# Patient Record
Sex: FEMALE | Race: WHITE | NOT HISPANIC OR LATINO | Employment: OTHER | URBAN - METROPOLITAN AREA
[De-identification: names, ages, dates, MRNs, and addresses within clinical notes are randomized per-mention and may not be internally consistent; named-entity substitution may affect disease eponyms.]

---

## 2024-10-16 ENCOUNTER — APPOINTMENT (EMERGENCY)
Dept: RADIOLOGY | Facility: HOSPITAL | Age: 29
End: 2024-10-16

## 2024-10-16 ENCOUNTER — HOSPITAL ENCOUNTER (EMERGENCY)
Facility: HOSPITAL | Age: 29
Discharge: HOME | End: 2024-10-16
Attending: EMERGENCY MEDICINE

## 2024-10-16 VITALS
BODY MASS INDEX: 20.89 KG/M2 | WEIGHT: 117.95 LBS | DIASTOLIC BLOOD PRESSURE: 78 MMHG | SYSTOLIC BLOOD PRESSURE: 126 MMHG | RESPIRATION RATE: 16 BRPM | HEART RATE: 104 BPM | OXYGEN SATURATION: 98 % | TEMPERATURE: 98.6 F

## 2024-10-16 DIAGNOSIS — J18.9 PNEUMONIA: Primary | ICD-10-CM

## 2024-10-16 LAB
ANION GAP SERPL CALCULATED.3IONS-SCNC: 11 MMOL/L (ref 4–13)
BASOPHILS # BLD AUTO: 0.01 THOUSANDS/ΜL (ref 0–0.1)
BASOPHILS NFR BLD AUTO: 0 % (ref 0–1)
BUN SERPL-MCNC: 6 MG/DL (ref 5–25)
CALCIUM SERPL-MCNC: 9.9 MG/DL (ref 8.4–10.2)
CARDIAC TROPONIN I PNL SERPL HS: <2 NG/L
CHLORIDE SERPL-SCNC: 99 MMOL/L (ref 96–108)
CO2 SERPL-SCNC: 27 MMOL/L (ref 21–32)
CREAT SERPL-MCNC: 0.68 MG/DL (ref 0.6–1.3)
D DIMER PPP FEU-MCNC: 0.35 UG/ML FEU
EOSINOPHIL # BLD AUTO: 0.06 THOUSAND/ΜL (ref 0–0.61)
EOSINOPHIL NFR BLD AUTO: 1 % (ref 0–6)
ERYTHROCYTE [DISTWIDTH] IN BLOOD BY AUTOMATED COUNT: 13.3 % (ref 11.6–15.1)
EXT PREGNANCY TEST URINE: NEGATIVE
EXT. CONTROL: NORMAL
FLUAV AG UPPER RESP QL IA.RAPID: NEGATIVE
FLUBV AG UPPER RESP QL IA.RAPID: NEGATIVE
GFR SERPL CREATININE-BSD FRML MDRD: 117 ML/MIN/1.73SQ M
GLUCOSE SERPL-MCNC: 92 MG/DL (ref 65–140)
HCT VFR BLD AUTO: 38.8 % (ref 34.8–46.1)
HGB BLD-MCNC: 12.2 G/DL (ref 11.5–15.4)
IMM GRANULOCYTES # BLD AUTO: 0.01 THOUSAND/UL (ref 0–0.2)
IMM GRANULOCYTES NFR BLD AUTO: 0 % (ref 0–2)
LYMPHOCYTES # BLD AUTO: 0.63 THOUSANDS/ΜL (ref 0.6–4.47)
LYMPHOCYTES NFR BLD AUTO: 11 % (ref 14–44)
MCH RBC QN AUTO: 26.2 PG (ref 26.8–34.3)
MCHC RBC AUTO-ENTMCNC: 31.4 G/DL (ref 31.4–37.4)
MCV RBC AUTO: 83 FL (ref 82–98)
MONOCYTES # BLD AUTO: 0.47 THOUSAND/ΜL (ref 0.17–1.22)
MONOCYTES NFR BLD AUTO: 8 % (ref 4–12)
NEUTROPHILS # BLD AUTO: 4.79 THOUSANDS/ΜL (ref 1.85–7.62)
NEUTS SEG NFR BLD AUTO: 80 % (ref 43–75)
NRBC BLD AUTO-RTO: 0 /100 WBCS
PLATELET # BLD AUTO: 201 THOUSANDS/UL (ref 149–390)
PMV BLD AUTO: 9.8 FL (ref 8.9–12.7)
POTASSIUM SERPL-SCNC: 3.8 MMOL/L (ref 3.5–5.3)
RBC # BLD AUTO: 4.66 MILLION/UL (ref 3.81–5.12)
S PYO DNA THROAT QL NAA+PROBE: NOT DETECTED
SARS-COV+SARS-COV-2 AG RESP QL IA.RAPID: NEGATIVE
SODIUM SERPL-SCNC: 137 MMOL/L (ref 135–147)
WBC # BLD AUTO: 5.97 THOUSAND/UL (ref 4.31–10.16)

## 2024-10-16 PROCEDURE — 99285 EMERGENCY DEPT VISIT HI MDM: CPT | Performed by: EMERGENCY MEDICINE

## 2024-10-16 PROCEDURE — 81025 URINE PREGNANCY TEST: CPT

## 2024-10-16 PROCEDURE — 87811 SARS-COV-2 COVID19 W/OPTIC: CPT

## 2024-10-16 PROCEDURE — 36415 COLL VENOUS BLD VENIPUNCTURE: CPT

## 2024-10-16 PROCEDURE — 93005 ELECTROCARDIOGRAM TRACING: CPT

## 2024-10-16 PROCEDURE — 85025 COMPLETE CBC W/AUTO DIFF WBC: CPT

## 2024-10-16 PROCEDURE — 87804 INFLUENZA ASSAY W/OPTIC: CPT

## 2024-10-16 PROCEDURE — 99285 EMERGENCY DEPT VISIT HI MDM: CPT

## 2024-10-16 PROCEDURE — 96360 HYDRATION IV INFUSION INIT: CPT

## 2024-10-16 PROCEDURE — 85379 FIBRIN DEGRADATION QUANT: CPT

## 2024-10-16 PROCEDURE — 80048 BASIC METABOLIC PNL TOTAL CA: CPT

## 2024-10-16 PROCEDURE — 84484 ASSAY OF TROPONIN QUANT: CPT

## 2024-10-16 PROCEDURE — 71046 X-RAY EXAM CHEST 2 VIEWS: CPT

## 2024-10-16 PROCEDURE — 87651 STREP A DNA AMP PROBE: CPT

## 2024-10-16 RX ADMIN — SODIUM CHLORIDE 1000 ML: 0.9 INJECTION, SOLUTION INTRAVENOUS at 19:25

## 2024-10-16 NOTE — ED ATTENDING ATTESTATION
10/16/2024  I, Xavier Taylor MD, saw and evaluated the patient. I have discussed the patient with the resident/non-physician practitioner and agree with the resident's/non-physician practitioner's findings, Plan of Care, and MDM as documented in the resident's/non-physician practitioner's note, except where noted. All available labs and Radiology studies were reviewed.  I was present for key portions of any procedure(s) performed by the resident/non-physician practitioner and I was immediately available to provide assistance.       At this point I agree with the current assessment done in the Emergency Department.  I have conducted an independent evaluation of this patient a history and physical is as follows:    29-year-old female presents to the emergency department for evaluation of a cough and shortness of breath.  Patient reports worsening productive cough with yellow sputum and congestion over the past 4 days.  She reports having intermittent associated shortness of breath that she describes as difficulty with taking a deep breath.  Reports right-sided pain under her right breast when taking a deep breath.  She has been treating her symptoms with Sudafed with only minimal relief so came to the emergency department for further evaluation.  She states that currently she is not having any chest pain.  No fever, chills, vomiting, diarrhea, recent travel, lower leg pain/swelling or localized numbness, tingling or weakness.    A 10 point review of systems was conducted and negative except for as stated above in the HPI.    Physical Exam  Vitals and nursing note reviewed.   Constitutional:       General: She is not in acute distress.     Appearance: She is well-developed.   HENT:      Head: Normocephalic and atraumatic.   Eyes:      Conjunctiva/sclera: Conjunctivae normal.   Cardiovascular:      Rate and Rhythm: Regular rhythm. Tachycardia present.      Heart sounds: No murmur heard.  Pulmonary:      Effort:  Pulmonary effort is normal. No respiratory distress.      Breath sounds: Normal breath sounds.   Abdominal:      Palpations: Abdomen is soft.      Tenderness: There is no abdominal tenderness.   Musculoskeletal:         General: No swelling.      Cervical back: Neck supple.   Skin:     General: Skin is warm and dry.      Capillary Refill: Capillary refill takes less than 2 seconds.   Neurological:      Mental Status: She is alert.   Psychiatric:         Mood and Affect: Mood normal.         ED Course     29-year-old female presented to the emergency department for evaluation of a cough and shortness of breath.  On arrival patient awake, alert, oriented and in no acute distress.  Physical exam was unremarkable.  EKG was ordered to evaluate for acute ischemia/arrhythmia.  On my interpretation EKG with a sinus rhythm with nonspecific T and ST changes.  Chest x-ray ordered to evaluate for acute cardiopulmonary process including but not limited to pneumonia, pneumothorax, edema, effusion.  Chest x-ray on my interpretation with a right-sided opacity.  Blood work done in the emergency department was grossly unremarkable including a troponin level within normal limits.  Patient with no episodes of chest pain while here in the emergency department.  Patient was treated symptomatically with a fluid bolus.  Patient was provided with a prescription for Augmentin.  Recommendation was made for the patient to follow-up with her PCP.  Return precautions were discussed.    Patient agrees with the plan for discharge and feels comfortable to go home with proper f/u. Advised to return for worsening or additional problems. Diagnostic tests were reviewed and questions answered. Diagnosis, care plan and treatment options were discussed. The patient understands instructions and will follow up as directed.      Critical Care Time  ECG 12 Lead Documentation Only    Date/Time: 10/16/2024 7:29 PM    Performed by: Xavier Taylor,  MD  Authorized by: Xavier Taylor MD    ECG reviewed by me, the ED Provider: yes    Patient location:  ED  Previous ECG:     Previous ECG:  Unavailable    Comparison to cardiac monitor: Yes    Interpretation:     Interpretation: abnormal    Rate:     ECG rate assessment: normal    Rhythm:     Rhythm: sinus rhythm    Ectopy:     Ectopy: none    QRS:     QRS axis:  Normal  Conduction:     Conduction: normal    ST segments:     ST segments:  Non-specific  T waves:     T waves: non-specific

## 2024-10-16 NOTE — ED PROVIDER NOTES
Time reflects when diagnosis was documented in both MDM as applicable and the Disposition within this note       Time User Action Codes Description Comment    10/16/2024  8:16 PM Cipriano Nicola Add [J18.9] Pneumonia           ED Disposition       ED Disposition   Discharge    Condition   Stable    Date/Time   Wed Oct 16, 2024  8:16 PM    Comment   Prerna Garcia discharge to home/self care.                   Assessment & Plan       Medical Decision Making  29 y.o. F who presents to the ED for cough productive of yellow-green sputum and shortness of breath x4 days. She states her cough began with some nausea on Sunday and has not significantly worsened or improved since. 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     Cardiac and pulmonary examination unremarkable. No pain is reproducible upon palpation inferior to the R. Axilla or peripheral to the R. Breast. Patient without increased work of breathing and does not endorse shortness of breath on examination. VS notable for tachycardia at 104. Consider viral URI vs. Strep infection vs. Pneumonia vs. ACS. Obtained CBC, BMP, Trops, FLU/COVID/RSV, ECG, CXR, D-dimer. Lab testing wnl, however, CXR with RUL opacity suspicious for RUL pneumonia. Patient is afebrile, does not meet SIRS criteria and breathing comfortably on RA, thus deemed stable for discharge with Augmentin provided. Advised patient follow up with her PCP in 2-3 days to monitor illness progression on antibiotics. Advised patient to return to the ED if symptoms worsen, or if new symptoms or concerns arise.     Amount and/or Complexity of Data Reviewed  Labs: ordered.  Radiology: ordered and independent interpretation performed.    Risk  Prescription drug management.             Medications   sodium chloride 0.9 % bolus 1,000 mL (1,000 mL Intravenous New Bag 10/16/24 1925)       ED Risk Strat Scores                           SBIRT  20yo+      Flowsheet Row Most Recent Value   Initial Alcohol Screen: US AUDIT-C     1. How often do you have a drink containing alcohol? 0 Filed at: 10/16/2024 1847   2. How many drinks containing alcohol do you have on a typical day you are drinking?  0 Filed at: 10/16/2024 1847   3a. Male UNDER 65: How often do you have five or more drinks on one occasion? 0 Filed at: 10/16/2024 1847   3b. FEMALE Any Age, or MALE 65+: How often do you have 4 or more drinks on one occassion? 0 Filed at: 10/16/2024 1847   Audit-C Score 0 Filed at: 10/16/2024 1847   NELL: How many times in the past year have you...    Used an illegal drug or used a prescription medication for non-medical reasons? Never Filed at: 10/16/2024 1847                            History of Present Illness       Chief Complaint   Patient presents with    Shortness of Breath     Cough x4 days that is not improving.  Feels sob with a lot of activity.  Pain under right breast with deep breath       Past Medical History:   Diagnosis Date    Abnormal Pap smear of cervix     HPV (human papilloma virus) infection     Varicella     vacc x?      Past Surgical History:   Procedure Laterality Date     SECTION      [place]  DELIVERY ONLY N/A 2016    Procedure:  SECTION ();  Surgeon: Mervin Brown MD;  Location: [facility];  Service: Obstetrics    [place] N/A 2019    Procedure:  SECTION () REPEAT;  Surgeon: Mervin Brown MD;  Location: [facility];  Service: Obstetrics    WISDOM TOOTH EXTRACTION        Family History   Problem Relation Age of Onset    Asthma Brother     Other Brother         SMOKING    Cancer Maternal Grandfather         lung    Other Maternal Grandfather         smoker    Migraines Mother     Other Mother         SMOKING    Other Father         SMOKING    Heart disease Father         MI    Hyperlipidemia Father     Cancer Paternal Aunt         brst    Varicose Veins Maternal Grandmother      Hypertension Maternal Grandmother     Heart disease Son         murmur      Social History     Tobacco Use    Smoking status: Former     Current packs/day: 0.00     Average packs/day: 1 pack/day for 3.0 years (3.0 ttl pk-yrs)     Types: Cigarettes     Start date: 2012     Quit date: 2015     Years since quittin.8    Smokeless tobacco: Never    Tobacco comments:     quit 3 yrs ago   Vaping Use    Vaping status: Never Used   Substance Use Topics    Alcohol use: No    Drug use: No      E-Cigarette/Vaping    E-Cigarette Use Never User       E-Cigarette/Vaping Substances      I have reviewed and agree with the history as documented.     29 y.o. F who presents to the ED for cough productive of yellow-green sputum and shortness of breath x4 days. She states her cough began with some nausea on  and has not significantly worsened or improved since. 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 She adds that she intermittently experiences pain under her R. Breast and feels uncomfortable if she takes a deep breath. She differentiates the sensation upon deep breath from pain, specifically, and notes it is uncomfortable. She states she has tried sudafed at home without much improvement in symptoms. She endorses chills but denies acute fever, diaphoresis, vomiting, diarrhea, abdominal pain, blood in the stool, hematuria or dysuria.           Review of Systems   Constitutional:  Positive for chills. Negative for diaphoresis and fever.   HENT:  Negative for congestion.    Respiratory:  Positive for cough (cough produces yellow-green sputum) and shortness of breath. Negative for chest tightness.    Cardiovascular:  Positive for chest pain. Negative for palpitations.   Gastrointestinal:  Negative for abdominal pain, blood in stool, diarrhea, nausea and vomiting.   Genitourinary:  Negative for dysuria, flank pain and urgency.    Musculoskeletal:  Negative for myalgias, neck pain and neck stiffness.   Neurological:  Negative for weakness, light-headedness and headaches.           Objective       ED Triage Vitals [10/16/24 1844]   Temperature Pulse Blood Pressure Respirations SpO2 Patient Position - Orthostatic VS   98.6 °F (37 °C) 104 126/78 16 98 % Lying      Temp Source Heart Rate Source BP Location FiO2 (%) Pain Score    Oral Monitor Left arm -- 3      Vitals      Date and Time Temp Pulse SpO2 Resp BP Pain Score FACES Pain Rating User   10/16/24 1844 98.6 °F (37 °C) 104 98 % 16 126/78 3 -- RR            Physical Exam  Constitutional:       General: She is not in acute distress.     Appearance: She is well-developed and normal weight. She is not ill-appearing, toxic-appearing or diaphoretic.   HENT:      Head: Normocephalic and atraumatic.   Eyes:      Extraocular Movements: Extraocular movements intact.      Pupils: Pupils are equal, round, and reactive to light.   Neck:      Vascular: No JVD.   Cardiovascular:      Rate and Rhythm: Regular rhythm. Tachycardia present.      Pulses: Normal pulses.      Heart sounds: Normal heart sounds.   Pulmonary:      Effort: Pulmonary effort is normal. No tachypnea, accessory muscle usage or respiratory distress.      Breath sounds: Normal breath sounds.   Chest:      Chest wall: No tenderness.   Abdominal:      General: Bowel sounds are normal.      Palpations: Abdomen is soft.      Tenderness: There is no abdominal tenderness. There is no guarding.   Musculoskeletal:      Cervical back: Neck supple.   Lymphadenopathy:      Cervical: No cervical adenopathy.   Skin:     General: Skin is warm and dry.      Capillary Refill: Capillary refill takes less than 2 seconds.   Neurological:      Mental Status: She is alert and oriented to person, place, and time.         Results Reviewed       Procedure Component Value Units Date/Time    HS Troponin I 4hr [[id number]]     Lab Status: No result Specimen: Blood      Strep A PCR [[id number]]  (Normal) Collected: 10/16/24 1926    Lab Status: Final result Specimen: Throat Updated: 10/16/24 1958     STREP A PCR Not Detected    HS Troponin 0hr (reflex protocol) [[id number]]  (Normal) Collected: 10/16/24 1926    Lab Status: Final result Specimen: Blood from Arm, Right Updated: 10/16/24 1954     hs TnI 0hr <2 ng/L     HS Troponin I 2hr [[id number]]     Lab Status: No result Specimen: Blood     FLU/COVID Rapid Antigen (30 min. TAT) - Preferred screening test in ED [[id number]]  (Normal) Collected: 10/16/24 1926    Lab Status: Final result Specimen: Nares from Nose Updated: 10/16/24 1948     SARS COV Rapid Antigen Negative     Influenza A Rapid Antigen Negative     Influenza B Rapid Antigen Negative    Narrative:      This test has been performed using the Quidel Valencia 2 FLU+SARS Antigen test under the Emergency Use Authorization (EUA). This test has been validated by the  and verified by the performing laboratory. The Valencia uses lateral flow immunofluorescent sandwich assay to detect SARS-COV, Influenza A and Influenza B Antigen.     The Quidel Valencia 2 SARS Antigen test does not differentiate between SARS-CoV and SARS-CoV-2.     Negative results are presumptive and may be confirmed with a molecular assay, if necessary, for patient management. Negative results do not rule out SARS-CoV-2 or influenza infection and should not be used as the sole basis for treatment or patient management decisions. A negative test result may occur if the level of antigen in a sample is below the limit of detection of this test.     Positive results are indicative of the presence of viral antigens, but do not rule out bacterial infection or co-infection with other viruses.     All test results should be used as an adjunct to clinical observations and other information available to the provider.    FOR PEDIATRIC PATIENTS - copy/paste COVID Guidelines URL to browser:  [email or web address]    Basic metabolic panel [[id number]] Collected: 10/16/24 1927    Lab Status: Final result Specimen: Blood from Arm, Right Updated: 10/16/24 1947     Sodium 137 mmol/L      Potassium 3.8 mmol/L      Chloride 99 mmol/L      CO2 27 mmol/L      ANION GAP 11 mmol/L      BUN 6 mg/dL      Creatinine 0.68 mg/dL      Glucose 92 mg/dL      Calcium 9.9 mg/dL      eGFR 117 ml/min/1.73sq m     Narrative:      National Kidney Disease Foundation guidelines for Chronic Kidney Disease (CKD):     Stage 1 with normal or high GFR (GFR > 90 mL/min/1.73 square meters)    Stage 2 Mild CKD (GFR = 60-89 mL/min/1.73 square meters)    Stage 3A Moderate CKD (GFR = 45-59 mL/min/1.73 square meters)    Stage 3B Moderate CKD (GFR = 30-44 mL/min/1.73 square meters)    Stage 4 Severe CKD (GFR = 15-29 mL/min/1.73 square meters)    Stage 5 End Stage CKD (GFR <15 mL/min/1.73 square meters)  Note: GFR calculation is accurate only with a steady state creatinine    D-Dimer [[id number]]  (Normal) Collected: 10/16/24 1926    Lab Status: Final result Specimen: Blood from Arm, Right Updated: 10/16/24 1943     D-Dimer, Quant 0.35 ug/ml FEU     CBC and differential [[id number]]  (Abnormal) Collected: 10/16/24 1926    Lab Status: Final result Specimen: Blood from Arm, Right Updated: 10/16/24 1931     WBC 5.97 Thousand/uL      RBC 4.66 Million/uL      Hemoglobin 12.2 g/dL      Hematocrit 38.8 %      MCV 83 fL      MCH 26.2 pg      MCHC 31.4 g/dL      RDW 13.3 %      MPV 9.8 fL      Platelets 201 Thousands/uL      nRBC 0 /100 WBCs      Segmented % 80 %      Immature Grans % 0 %      Lymphocytes % 11 %      Monocytes % 8 %      Eosinophils Relative 1 %      Basophils Relative 0 %      Absolute Neutrophils 4.79 Thousands/µL      Absolute Immature Grans 0.01 Thousand/uL      Absolute Lymphocytes 0.63 Thousands/µL      Absolute Monocytes 0.47 Thousand/µL      Eosinophils Absolute 0.06 Thousand/µL       Basophils Absolute 0.01 Thousands/µL     POCT pregnancy, urine [[id number]]  (Normal) Resulted: 10/16/24 1931    Lab Status: Final result Updated: 10/16/24 1931     EXT Preg Test, Ur Negative     Control Valid            XR chest 2 views   ED Interpretation by Xavier Taylor MD (10/16 2021)   No pneumothorax.  No significant edema or effusion.  Right-sided opacity.          Procedures    ED Medication and Procedure Management   None     Patient's Medications   Discharge Prescriptions    AMOXICILLIN-CLAVULANATE (AUGMENTIN) 875-125 MG PER TABLET    Take 1 tablet by mouth every 12 (twelve) hours for 7 days       Start Date: 10/16/2024End Date: 10/23/2024       Order Dose: 1 tablet       Quantity: 14 tablet    Refills: 0     No discharge procedures on file.  ED SEPSIS DOCUMENTATION   Time reflects when diagnosis was documented in both MDM as applicable and the Disposition within this note       Time User Action Codes Description Comment    10/16/2024  8:16 PM Nicola Cota [J18.9] Pneumonia                  Nicola Cota MD  10/16/24 2023       Nicola Cota MD  10/16/24 2024

## 2024-10-17 LAB
ATRIAL RATE: 101 BPM
P AXIS: 76 DEGREES
PR INTERVAL: 140 MS
QRS AXIS: 78 DEGREES
QRSD INTERVAL: 80 MS
QT INTERVAL: 296 MS
QTC INTERVAL: 383 MS
T WAVE AXIS: -35 DEGREES
VENTRICULAR RATE: 101 BPM

## 2024-10-17 PROCEDURE — 93010 ELECTROCARDIOGRAM REPORT: CPT | Performed by: INTERNAL MEDICINE

## 2024-10-22 ENCOUNTER — APPOINTMENT (EMERGENCY)
Dept: RADIOLOGY | Facility: HOSPITAL | Age: 29
DRG: 139 | End: 2024-10-22
Payer: COMMERCIAL

## 2024-10-22 ENCOUNTER — HOSPITAL ENCOUNTER (INPATIENT)
Facility: HOSPITAL | Age: 29
LOS: 2 days | Discharge: HOME | DRG: 139 | End: 2024-10-26
Attending: EMERGENCY MEDICINE | Admitting: INTERNAL MEDICINE
Payer: COMMERCIAL

## 2024-10-22 DIAGNOSIS — R00.0 TACHYCARDIA: ICD-10-CM

## 2024-10-22 DIAGNOSIS — J18.9 PNEUMONIA OF BOTH UPPER LOBES DUE TO INFECTIOUS ORGANISM: Primary | ICD-10-CM

## 2024-10-22 DIAGNOSIS — J18.9 RIGHT UPPER LOBE PNEUMONIA: ICD-10-CM

## 2024-10-22 DIAGNOSIS — J18.9 PNEUMONIA INVOLVING RIGHT LUNG: ICD-10-CM

## 2024-10-22 LAB
ALBUMIN SERPL BCG-MCNC: 3.7 G/DL (ref 3.5–5)
ALP SERPL-CCNC: 40 U/L (ref 34–104)
ALT SERPL W P-5'-P-CCNC: 10 U/L (ref 7–52)
ANION GAP SERPL CALCULATED.3IONS-SCNC: 9 MMOL/L (ref 4–13)
AST SERPL W P-5'-P-CCNC: 14 U/L (ref 13–39)
BASOPHILS # BLD AUTO: 0.02 THOUSANDS/ΜL (ref 0–0.1)
BASOPHILS NFR BLD AUTO: 0 % (ref 0–1)
BILIRUB SERPL-MCNC: 0.73 MG/DL (ref 0.2–1)
BUN SERPL-MCNC: 5 MG/DL (ref 5–25)
CALCIUM SERPL-MCNC: 9.2 MG/DL (ref 8.4–10.2)
CHLORIDE SERPL-SCNC: 101 MMOL/L (ref 96–108)
CO2 SERPL-SCNC: 28 MMOL/L (ref 21–32)
CREAT SERPL-MCNC: 0.58 MG/DL (ref 0.6–1.3)
EOSINOPHIL # BLD AUTO: 0.29 THOUSAND/ΜL (ref 0–0.61)
EOSINOPHIL NFR BLD AUTO: 5 % (ref 0–6)
ERYTHROCYTE [DISTWIDTH] IN BLOOD BY AUTOMATED COUNT: 13.2 % (ref 11.6–15.1)
FLUAV AG UPPER RESP QL IA.RAPID: NEGATIVE
FLUBV AG UPPER RESP QL IA.RAPID: NEGATIVE
GFR SERPL CREATININE-BSD FRML MDRD: 124 ML/MIN/1.73SQ M
GLUCOSE SERPL-MCNC: 94 MG/DL (ref 65–140)
HCT VFR BLD AUTO: 30.5 % (ref 34.8–46.1)
HGB BLD-MCNC: 9.6 G/DL (ref 11.5–15.4)
IMM GRANULOCYTES # BLD AUTO: 0.02 THOUSAND/UL (ref 0–0.2)
IMM GRANULOCYTES NFR BLD AUTO: 0 % (ref 0–2)
LACTATE SERPL-SCNC: 0.7 MMOL/L (ref 0.5–2)
LYMPHOCYTES # BLD AUTO: 0.65 THOUSANDS/ΜL (ref 0.6–4.47)
LYMPHOCYTES NFR BLD AUTO: 10 % (ref 14–44)
MCH RBC QN AUTO: 26.2 PG (ref 26.8–34.3)
MCHC RBC AUTO-ENTMCNC: 31.5 G/DL (ref 31.4–37.4)
MCV RBC AUTO: 83 FL (ref 82–98)
MONOCYTES # BLD AUTO: 0.42 THOUSAND/ΜL (ref 0.17–1.22)
MONOCYTES NFR BLD AUTO: 7 % (ref 4–12)
NEUTROPHILS # BLD AUTO: 4.84 THOUSANDS/ΜL (ref 1.85–7.62)
NEUTS SEG NFR BLD AUTO: 78 % (ref 43–75)
NRBC BLD AUTO-RTO: 0 /100 WBCS
PLATELET # BLD AUTO: 265 THOUSANDS/UL (ref 149–390)
PMV BLD AUTO: 9.6 FL (ref 8.9–12.7)
POTASSIUM SERPL-SCNC: 3.8 MMOL/L (ref 3.5–5.3)
PROT SERPL-MCNC: 7.1 G/DL (ref 6.4–8.4)
RBC # BLD AUTO: 3.67 MILLION/UL (ref 3.81–5.12)
SARS-COV+SARS-COV-2 AG RESP QL IA.RAPID: NEGATIVE
SODIUM SERPL-SCNC: 138 MMOL/L (ref 135–147)
WBC # BLD AUTO: 6.24 THOUSAND/UL (ref 4.31–10.16)

## 2024-10-22 PROCEDURE — 86140 C-REACTIVE PROTEIN: CPT | Performed by: INTERNAL MEDICINE

## 2024-10-22 PROCEDURE — 84703 CHORIONIC GONADOTROPIN ASSAY: CPT | Performed by: INTERNAL MEDICINE

## 2024-10-22 PROCEDURE — 82607 VITAMIN B-12: CPT | Performed by: INTERNAL MEDICINE

## 2024-10-22 PROCEDURE — 85025 COMPLETE CBC W/AUTO DIFF WBC: CPT | Performed by: EMERGENCY MEDICINE

## 2024-10-22 PROCEDURE — 99285 EMERGENCY DEPT VISIT HI MDM: CPT | Performed by: EMERGENCY MEDICINE

## 2024-10-22 PROCEDURE — 82728 ASSAY OF FERRITIN: CPT | Performed by: INTERNAL MEDICINE

## 2024-10-22 PROCEDURE — 84443 ASSAY THYROID STIM HORMONE: CPT | Performed by: INTERNAL MEDICINE

## 2024-10-22 PROCEDURE — 71046 X-RAY EXAM CHEST 2 VIEWS: CPT

## 2024-10-22 PROCEDURE — 85652 RBC SED RATE AUTOMATED: CPT | Performed by: INTERNAL MEDICINE

## 2024-10-22 PROCEDURE — 83540 ASSAY OF IRON: CPT | Performed by: INTERNAL MEDICINE

## 2024-10-22 PROCEDURE — 36415 COLL VENOUS BLD VENIPUNCTURE: CPT | Performed by: EMERGENCY MEDICINE

## 2024-10-22 PROCEDURE — 87811 SARS-COV-2 COVID19 W/OPTIC: CPT | Performed by: EMERGENCY MEDICINE

## 2024-10-22 PROCEDURE — 83605 ASSAY OF LACTIC ACID: CPT | Performed by: EMERGENCY MEDICINE

## 2024-10-22 PROCEDURE — 80053 COMPREHEN METABOLIC PANEL: CPT | Performed by: EMERGENCY MEDICINE

## 2024-10-22 PROCEDURE — 82746 ASSAY OF FOLIC ACID SERUM: CPT | Performed by: INTERNAL MEDICINE

## 2024-10-22 PROCEDURE — 87804 INFLUENZA ASSAY W/OPTIC: CPT | Performed by: EMERGENCY MEDICINE

## 2024-10-22 PROCEDURE — 83550 IRON BINDING TEST: CPT | Performed by: INTERNAL MEDICINE

## 2024-10-22 PROCEDURE — 99284 EMERGENCY DEPT VISIT MOD MDM: CPT

## 2024-10-22 PROCEDURE — 84145 PROCALCITONIN (PCT): CPT | Performed by: EMERGENCY MEDICINE

## 2024-10-22 RX ORDER — ACETAMINOPHEN 325 MG/1
975 TABLET ORAL ONCE
Status: COMPLETED | OUTPATIENT
Start: 2024-10-22 | End: 2024-10-22

## 2024-10-22 RX ADMIN — ACETAMINOPHEN 975 MG: 325 TABLET, FILM COATED ORAL at 23:23

## 2024-10-23 ENCOUNTER — APPOINTMENT (OUTPATIENT)
Dept: CT IMAGING | Facility: HOSPITAL | Age: 29
DRG: 139 | End: 2024-10-23
Payer: COMMERCIAL

## 2024-10-23 PROBLEM — E83.42 HYPOMAGNESEMIA: Status: ACTIVE | Noted: 2024-10-23

## 2024-10-23 PROBLEM — J18.9 PNEUMONIA INVOLVING RIGHT LUNG: Status: ACTIVE | Noted: 2024-10-23

## 2024-10-23 PROBLEM — R00.0 TACHYCARDIA: Status: ACTIVE | Noted: 2024-10-23

## 2024-10-23 PROBLEM — D64.9 ANEMIA: Status: ACTIVE | Noted: 2024-10-23

## 2024-10-23 LAB
ANION GAP SERPL CALCULATED.3IONS-SCNC: 10 MMOL/L (ref 4–13)
B PARAP IS1001 DNA NPH QL NAA+NON-PROBE: NOT DETECTED
B PERT.PT PRMT NPH QL NAA+NON-PROBE: NOT DETECTED
BUN SERPL-MCNC: 4 MG/DL (ref 5–25)
C PNEUM DNA NPH QL NAA+NON-PROBE: NOT DETECTED
CALCIUM SERPL-MCNC: 8.4 MG/DL (ref 8.4–10.2)
CARDIAC TROPONIN I PNL SERPL HS: <2 NG/L
CHLORIDE SERPL-SCNC: 104 MMOL/L (ref 96–108)
CO2 SERPL-SCNC: 26 MMOL/L (ref 21–32)
CREAT SERPL-MCNC: 0.57 MG/DL (ref 0.6–1.3)
CRP SERPL QL: 51.4 MG/L
ERYTHROCYTE [DISTWIDTH] IN BLOOD BY AUTOMATED COUNT: 13.3 % (ref 11.6–15.1)
ERYTHROCYTE [SEDIMENTATION RATE] IN BLOOD: 54 MM/HOUR (ref 0–19)
FERRITIN SERPL-MCNC: 17 NG/ML (ref 11–307)
FLUAV RNA NPH QL NAA+NON-PROBE: NOT DETECTED
FLUBV RNA NPH QL NAA+NON-PROBE: NOT DETECTED
FOLATE SERPL-MCNC: 16.5 NG/ML
GFR SERPL CREATININE-BSD FRML MDRD: 124 ML/MIN/1.73SQ M
GLUCOSE SERPL-MCNC: 91 MG/DL (ref 65–140)
HADV DNA NPH QL NAA+NON-PROBE: NOT DETECTED
HCG SERPL QL: NEGATIVE
HCOV 229E RNA NPH QL NAA+NON-PROBE: NOT DETECTED
HCOV HKU1 RNA NPH QL NAA+NON-PROBE: NOT DETECTED
HCOV NL63 RNA NPH QL NAA+NON-PROBE: NOT DETECTED
HCOV OC43 RNA NPH QL NAA+NON-PROBE: NOT DETECTED
HCT VFR BLD AUTO: 28.3 % (ref 34.8–46.1)
HGB BLD-MCNC: 9.1 G/DL (ref 11.5–15.4)
HMPV RNA NPH QL NAA+NON-PROBE: NOT DETECTED
HPIV1 RNA NPH QL NAA+NON-PROBE: NOT DETECTED
HPIV2 RNA NPH QL NAA+NON-PROBE: NOT DETECTED
HPIV3 RNA NPH QL NAA+NON-PROBE: NOT DETECTED
HPIV4 RNA NPH QL NAA+NON-PROBE: NOT DETECTED
IRON SATN MFR SERPL: 4 % (ref 15–50)
IRON SERPL-MCNC: 13 UG/DL (ref 50–212)
L PNEUMO1 AG UR QL IA.RAPID: NEGATIVE
M PNEUMO DNA NPH QL NAA+NON-PROBE: NOT DETECTED
MAGNESIUM SERPL-MCNC: 1.7 MG/DL (ref 1.9–2.7)
MCH RBC QN AUTO: 27.7 PG (ref 26.8–34.3)
MCHC RBC AUTO-ENTMCNC: 32.2 G/DL (ref 31.4–37.4)
MCV RBC AUTO: 86 FL (ref 82–98)
PLATELET # BLD AUTO: 254 THOUSANDS/UL (ref 149–390)
PMV BLD AUTO: 10 FL (ref 8.9–12.7)
POTASSIUM SERPL-SCNC: 3.8 MMOL/L (ref 3.5–5.3)
PROCALCITONIN SERPL-MCNC: <0.05 NG/ML
RBC # BLD AUTO: 3.29 MILLION/UL (ref 3.81–5.12)
RSV RNA NPH QL NAA+NON-PROBE: NOT DETECTED
RV+EV RNA NPH QL NAA+NON-PROBE: NOT DETECTED
S PNEUM AG UR QL: NEGATIVE
SARS-COV-2 RNA NPH QL NAA+NON-PROBE: NOT DETECTED
SODIUM SERPL-SCNC: 140 MMOL/L (ref 135–147)
TIBC SERPL-MCNC: 319 UG/DL (ref 250–450)
TSH SERPL DL<=0.05 MIU/L-ACNC: 1.73 UIU/ML (ref 0.45–4.5)
UIBC SERPL-MCNC: 306 UG/DL (ref 155–355)
VIT B12 SERPL-MCNC: 595 PG/ML (ref 180–914)
WBC # BLD AUTO: 5.27 THOUSAND/UL (ref 4.31–10.16)

## 2024-10-23 PROCEDURE — 99222 1ST HOSP IP/OBS MODERATE 55: CPT | Performed by: INTERNAL MEDICINE

## 2024-10-23 PROCEDURE — 87449 NOS EACH ORGANISM AG IA: CPT | Performed by: INTERNAL MEDICINE

## 2024-10-23 PROCEDURE — 83735 ASSAY OF MAGNESIUM: CPT | Performed by: INTERNAL MEDICINE

## 2024-10-23 PROCEDURE — 85027 COMPLETE CBC AUTOMATED: CPT | Performed by: INTERNAL MEDICINE

## 2024-10-23 PROCEDURE — 84484 ASSAY OF TROPONIN QUANT: CPT | Performed by: INTERNAL MEDICINE

## 2024-10-23 PROCEDURE — 0202U NFCT DS 22 TRGT SARS-COV-2: CPT | Performed by: INTERNAL MEDICINE

## 2024-10-23 PROCEDURE — 71250 CT THORAX DX C-: CPT

## 2024-10-23 PROCEDURE — 93005 ELECTROCARDIOGRAM TRACING: CPT

## 2024-10-23 PROCEDURE — 94664 DEMO&/EVAL PT USE INHALER: CPT

## 2024-10-23 PROCEDURE — 99232 SBSQ HOSP IP/OBS MODERATE 35: CPT | Performed by: INTERNAL MEDICINE

## 2024-10-23 PROCEDURE — 36415 COLL VENOUS BLD VENIPUNCTURE: CPT | Performed by: INTERNAL MEDICINE

## 2024-10-23 PROCEDURE — 80048 BASIC METABOLIC PNL TOTAL CA: CPT | Performed by: INTERNAL MEDICINE

## 2024-10-23 PROCEDURE — 94640 AIRWAY INHALATION TREATMENT: CPT

## 2024-10-23 PROCEDURE — 94760 N-INVAS EAR/PLS OXIMETRY 1: CPT

## 2024-10-23 RX ORDER — SODIUM CHLORIDE 9 MG/ML
75 INJECTION, SOLUTION INTRAVENOUS CONTINUOUS
Status: DISCONTINUED | OUTPATIENT
Start: 2024-10-23 | End: 2024-10-25

## 2024-10-23 RX ORDER — LORATADINE 10 MG/1
10 TABLET ORAL DAILY
Status: DISCONTINUED | OUTPATIENT
Start: 2024-10-23 | End: 2024-10-26 | Stop reason: HOSPADM

## 2024-10-23 RX ORDER — GUAIFENESIN 600 MG/1
600 TABLET, EXTENDED RELEASE ORAL EVERY 12 HOURS SCHEDULED
Status: DISCONTINUED | OUTPATIENT
Start: 2024-10-23 | End: 2024-10-26 | Stop reason: HOSPADM

## 2024-10-23 RX ORDER — CEFTRIAXONE 1 G/50ML
1000 INJECTION, SOLUTION INTRAVENOUS ONCE
Status: DISCONTINUED | OUTPATIENT
Start: 2024-10-23 | End: 2024-10-23

## 2024-10-23 RX ORDER — ACETAMINOPHEN 325 MG/1
650 TABLET ORAL EVERY 6 HOURS PRN
Status: DISCONTINUED | OUTPATIENT
Start: 2024-10-23 | End: 2024-10-26 | Stop reason: HOSPADM

## 2024-10-23 RX ORDER — SODIUM CHLORIDE 9 MG/ML
75 INJECTION, SOLUTION INTRAVENOUS CONTINUOUS
Status: DISCONTINUED | OUTPATIENT
Start: 2024-10-23 | End: 2024-10-23

## 2024-10-23 RX ORDER — BENZONATATE 100 MG/1
100 CAPSULE ORAL 3 TIMES DAILY PRN
Status: DISCONTINUED | OUTPATIENT
Start: 2024-10-23 | End: 2024-10-26 | Stop reason: HOSPADM

## 2024-10-23 RX ORDER — DOCUSATE SODIUM 100 MG/1
100 CAPSULE, LIQUID FILLED ORAL 2 TIMES DAILY
Status: DISCONTINUED | OUTPATIENT
Start: 2024-10-23 | End: 2024-10-26 | Stop reason: HOSPADM

## 2024-10-23 RX ORDER — ENOXAPARIN SODIUM 100 MG/ML
40 INJECTION SUBCUTANEOUS DAILY
Status: DISCONTINUED | OUTPATIENT
Start: 2024-10-23 | End: 2024-10-26 | Stop reason: HOSPADM

## 2024-10-23 RX ORDER — ACETAMINOPHEN 325 MG/1
975 TABLET ORAL EVERY 8 HOURS SCHEDULED
Status: DISCONTINUED | OUTPATIENT
Start: 2024-10-23 | End: 2024-10-23

## 2024-10-23 RX ORDER — MAGNESIUM SULFATE HEPTAHYDRATE 40 MG/ML
2 INJECTION, SOLUTION INTRAVENOUS ONCE
Status: DISCONTINUED | OUTPATIENT
Start: 2024-10-23 | End: 2024-10-23

## 2024-10-23 RX ORDER — HYDROCODONE POLISTIREX AND CHLORPHENIRAMINE POLISTIREX 10; 8 MG/5ML; MG/5ML
5 SUSPENSION, EXTENDED RELEASE ORAL EVERY 12 HOURS
Status: COMPLETED | OUTPATIENT
Start: 2024-10-23 | End: 2024-10-24

## 2024-10-23 RX ORDER — BUDESONIDE 0.5 MG/2ML
0.5 INHALANT ORAL
Status: DISCONTINUED | OUTPATIENT
Start: 2024-10-23 | End: 2024-10-23

## 2024-10-23 RX ORDER — CEFTRIAXONE 1 G/50ML
1000 INJECTION, SOLUTION INTRAVENOUS EVERY 24 HOURS
Status: DISCONTINUED | OUTPATIENT
Start: 2024-10-23 | End: 2024-10-26 | Stop reason: HOSPADM

## 2024-10-23 RX ORDER — LEVALBUTEROL INHALATION SOLUTION 1.25 MG/3ML
1.25 SOLUTION RESPIRATORY (INHALATION)
Status: DISCONTINUED | OUTPATIENT
Start: 2024-10-23 | End: 2024-10-26 | Stop reason: HOSPADM

## 2024-10-23 RX ORDER — AZITHROMYCIN 500 MG/1
500 TABLET, FILM COATED ORAL EVERY 24 HOURS
Status: COMPLETED | OUTPATIENT
Start: 2024-10-24 | End: 2024-10-25

## 2024-10-23 RX ORDER — MAGNESIUM SULFATE HEPTAHYDRATE 40 MG/ML
2 INJECTION, SOLUTION INTRAVENOUS ONCE
Status: COMPLETED | OUTPATIENT
Start: 2024-10-23 | End: 2024-10-23

## 2024-10-23 RX ORDER — ONDANSETRON 2 MG/ML
4 INJECTION INTRAMUSCULAR; INTRAVENOUS EVERY 6 HOURS PRN
Status: DISCONTINUED | OUTPATIENT
Start: 2024-10-23 | End: 2024-10-26 | Stop reason: HOSPADM

## 2024-10-23 RX ADMIN — DOCUSATE SODIUM 100 MG: 100 CAPSULE, LIQUID FILLED ORAL at 09:02

## 2024-10-23 RX ADMIN — HYDROCODONE POLISTIREX AND CHLORPHENIRAMINE POLISTIREX 5 ML: 10; 8 SUSPENSION, EXTENDED RELEASE ORAL at 11:55

## 2024-10-23 RX ADMIN — LEVALBUTEROL HYDROCHLORIDE 1.25 MG: 1.25 SOLUTION RESPIRATORY (INHALATION) at 14:48

## 2024-10-23 RX ADMIN — CEFTRIAXONE 1000 MG: 1 INJECTION, SOLUTION INTRAVENOUS at 23:57

## 2024-10-23 RX ADMIN — DOCUSATE SODIUM 100 MG: 100 CAPSULE, LIQUID FILLED ORAL at 17:02

## 2024-10-23 RX ADMIN — LEVALBUTEROL HYDROCHLORIDE 1.25 MG: 1.25 SOLUTION RESPIRATORY (INHALATION) at 09:08

## 2024-10-23 RX ADMIN — HYDROCODONE POLISTIREX AND CHLORPHENIRAMINE POLISTIREX 5 ML: 10; 8 SUSPENSION, EXTENDED RELEASE ORAL at 23:57

## 2024-10-23 RX ADMIN — LEVALBUTEROL HYDROCHLORIDE 1.25 MG: 1.25 SOLUTION RESPIRATORY (INHALATION) at 20:00

## 2024-10-23 RX ADMIN — GUAIFENESIN 600 MG: 600 TABLET ORAL at 09:02

## 2024-10-23 RX ADMIN — GUAIFENESIN 600 MG: 600 TABLET ORAL at 21:29

## 2024-10-23 RX ADMIN — ENOXAPARIN SODIUM 40 MG: 40 INJECTION SUBCUTANEOUS at 09:02

## 2024-10-23 RX ADMIN — CEFTRIAXONE 1000 MG: 1 INJECTION, SOLUTION INTRAVENOUS at 00:45

## 2024-10-23 RX ADMIN — GUAIFENESIN 600 MG: 600 TABLET ORAL at 01:15

## 2024-10-23 RX ADMIN — MAGNESIUM SULFATE HEPTAHYDRATE 2 G: 40 INJECTION, SOLUTION INTRAVENOUS at 06:44

## 2024-10-23 RX ADMIN — HYDROCODONE POLISTIREX AND CHLORPHENIRAMINE POLISTIREX 5 ML: 10; 8 SUSPENSION, EXTENDED RELEASE ORAL at 01:15

## 2024-10-23 RX ADMIN — Medication 500 MG: at 01:26

## 2024-10-23 RX ADMIN — LORATADINE 10 MG: 10 TABLET ORAL at 10:47

## 2024-10-23 RX ADMIN — ACETAMINOPHEN 650 MG: 325 TABLET, FILM COATED ORAL at 17:02

## 2024-10-23 RX ADMIN — SODIUM CHLORIDE 75 ML/HR: 0.9 INJECTION, SOLUTION INTRAVENOUS at 10:11

## 2024-10-23 NOTE — PLAN OF CARE
Problem: PAIN - ADULT  Goal: Verbalizes/displays adequate comfort level or baseline comfort level  Description: Interventions:  - Encourage patient to monitor pain and request assistance  - Assess pain using appropriate pain scale  - Administer analgesics based on type and severity of pain and evaluate response  - Implement non-pharmacological measures as appropriate and evaluate response  - Consider cultural and social influences on pain and pain management  - Notify physician/advanced practitioner if interventions unsuccessful or patient reports new pain  Outcome: Progressing     Problem: INFECTION - ADULT  Goal: Absence or prevention of progression during hospitalization  Description: INTERVENTIONS:  - Assess and monitor for signs and symptoms of infection  - Monitor lab/diagnostic results  - Monitor all insertion sites, i.e. indwelling lines, tubes, and drains  - Monitor endotracheal if appropriate and nasal secretions for changes in amount and color  - Emerson appropriate cooling/warming therapies per order  - Administer medications as ordered  - Instruct and encourage patient and family to use good hand hygiene technique  - Identify and instruct in appropriate isolation precautions for identified infection/condition  Outcome: Progressing  Goal: Absence of fever/infection during neutropenic period  Description: INTERVENTIONS:  - Monitor WBC    Outcome: Progressing     Problem: SAFETY ADULT  Goal: Patient will remain free of falls  Description: INTERVENTIONS:  - Educate patient/family on patient safety including physical limitations  - Instruct patient to call for assistance with activity   - Consult OT/PT to assist with strengthening/mobility   - Keep Call bell within reach  - Keep bed low and locked with side rails adjusted as appropriate  - Keep care items and personal belongings within reach  - Initiate and maintain comfort rounds  - Make Fall Risk Sign visible to staff  - Offer Toileting every Hours, in  advance of need  - Initiate/Maintain alarm  - Obtain necessary fall risk management equipment:   - Apply yellow socks and bracelet for high fall risk patients  - Consider moving patient to room near nurses station  Outcome: Progressing  Goal: Maintain or return to baseline ADL function  Description: INTERVENTIONS:  -  Assess patient's ability to carry out ADLs; assess patient's baseline for ADL function and identify physical deficits which impact ability to perform ADLs (bathing, care of mouth/teeth, toileting, grooming, dressing, etc.)  - Assess/evaluate cause of self-care deficits   - Assess range of motion  - Assess patient's mobility; develop plan if impaired  - Assess patient's need for assistive devices and provide as appropriate  - Encourage maximum independence but intervene and supervise when necessary  - Involve family in performance of ADLs  - Assess for home care needs following discharge   - Consider OT consult to assist with ADL evaluation and planning for discharge  - Provide patient education as appropriate  Outcome: Progressing     Problem: DISCHARGE PLANNING  Goal: Discharge to home or other facility with appropriate resources  Description: INTERVENTIONS:  - Identify barriers to discharge w/patient and caregiver  - Arrange for needed discharge resources and transportation as appropriate  - Identify discharge learning needs (meds, wound care, etc.)  - Arrange for interpretive services to assist at discharge as needed  - Refer to Case Management Department for coordinating discharge planning if the patient needs post-hospital services based on physician/advanced practitioner order or complex needs related to functional status, cognitive ability, or social support system  Outcome: Progressing     Problem: Knowledge Deficit  Goal: Patient/family/caregiver demonstrates understanding of disease process, treatment plan, medications, and discharge instructions  Description: Complete learning assessment and  assess knowledge base.  Interventions:  - Provide teaching at level of understanding  - Provide teaching via preferred learning methods  Outcome: Progressing

## 2024-10-23 NOTE — RESPIRATORY THERAPY NOTE
"RT Protocol Note  Prerna Garcia 29 y.o. female MRN: [id number]  Unit/Bed#: -01 Encounter: [id number]    Assessment    Principal Problem:    Pneumonia involving right lung      Home Pulmonary Medications:  NONE       Past Medical History:   Diagnosis Date    Abnormal Pap smear of cervix     HPV (human papilloma virus) infection     Varicella     vacc x?              10/23/24 0135   Respiratory Protocol   Protocol Initiated? Yes   Protocol Selection Respiratory   Language Barrier? No   Medical & Social History Reviewed? Yes   Diagnostic Studies Reviewed? Yes   Physical Assessment Performed? Yes   Respiratory Plan No distress/Pulmonary history   Respiratory Assessment   Assessment Type Assess only   General Appearance Alert;Awake   Respiratory Pattern Normal   Chest Assessment Chest expansion symmetrical   Bilateral Breath Sounds Diminished;Rales;Coarse   Cough Productive;Moist   Resp Comments Pt assessed for Respiratory Protocol.  BS diminished, coarse with rales. SPO2 96% on room air. Pt without Pulmonary history. Currently being treated for pneumonia. Pulmicort not indicated, will D/C as per protocol. COntinue with TID Xopenex as ordered for pt comfort.   Additional Assessments   Pulse 97   Respirations 18   SpO2 96 %              Objective    Physical Exam:   Assessment Type: Assess only  General Appearance: Alert, Awake  Respiratory Pattern: Normal  Chest Assessment: Chest expansion symmetrical  Bilateral Breath Sounds: Diminished, Rales, Coarse  Cough: Productive, Moist    Vitals:  Blood pressure 105/66, pulse 97, temperature 98.5 °F (36.9 °C), temperature source Oral, resp. rate 18, height 5' 4\" (1.626 m), weight 53.5 kg (118 lb), last menstrual period 09/16/2024, SpO2 96%, not currently breastfeeding.          Imaging and other studies: Results Review Statement: I reviewed radiology reports from this admission including: chest xray and CT chest.          Plan    Respiratory Plan: No distress/Pulmonary " history        Resp Comments: Pt assessed for Respiratory Protocol.  BS diminished, coarse with rales. SPO2 96% on room air. Pt without Pulmonary history. Currently being treated for pneumonia. Pulmicort not indicated, will D/C as per protocol. COntinue with TID Xopenex as ordered for pt comfort.

## 2024-10-23 NOTE — PLAN OF CARE
Problem: PAIN - ADULT  Goal: Verbalizes/displays adequate comfort level or baseline comfort level  Description: Interventions:  - Encourage patient to monitor pain and request assistance  - Assess pain using appropriate pain scale  - Administer analgesics based on type and severity of pain and evaluate response  - Implement non-pharmacological measures as appropriate and evaluate response  - Consider cultural and social influences on pain and pain management  - Notify physician/advanced practitioner if interventions unsuccessful or patient reports new pain  Outcome: Progressing     Problem: INFECTION - ADULT  Goal: Absence or prevention of progression during hospitalization  Description: INTERVENTIONS:  - Assess and monitor for signs and symptoms of infection  - Monitor lab/diagnostic results  - Monitor all insertion sites, i.e. indwelling lines, tubes, and drains  - Monitor endotracheal if appropriate and nasal secretions for changes in amount and color  - [place] appropriate cooling/warming therapies per order  - Administer medications as ordered  - Instruct and encourage patient and family to use good hand hygiene technique  - Identify and instruct in appropriate isolation precautions for identified infection/condition  Outcome: Progressing  Goal: Absence of fever/infection during neutropenic period  Description: INTERVENTIONS:  - Monitor WBC    Outcome: Progressing     Problem: SAFETY ADULT  Goal: Patient will remain free of falls  Description: INTERVENTIONS:  - Educate patient/family on patient safety including physical limitations  - Instruct patient to call for assistance with activity   - Consult OT/PT to assist with strengthening/mobility   - Keep Call bell within reach  - Keep bed low and locked with side rails adjusted as appropriate  - Keep care items and personal belongings within reach  - Initiate and maintain comfort rounds  - Make Fall Risk Sign visible to staff  - Offer Toileting every 2 Hours,  in advance of need  - Initiate/Maintain alarm  - Obtain necessary fall risk management equipment: not a fall risk  - Apply yellow socks and bracelet for high fall risk patients  - Consider moving patient to room near nurses station  Outcome: Progressing  Goal: Maintain or return to baseline ADL function  Description: INTERVENTIONS:  -  Assess patient's ability to carry out ADLs; assess patient's baseline for ADL function and identify physical deficits which impact ability to perform ADLs (bathing, care of mouth/teeth, toileting, grooming, dressing, etc.)  - Assess/evaluate cause of self-care deficits   - Assess range of motion  - Assess patient's mobility; develop plan if impaired  - Assess patient's need for assistive devices and provide as appropriate  - Encourage maximum independence but intervene and supervise when necessary  - Involve family in performance of ADLs  - Assess for home care needs following discharge   - Consider OT consult to assist with ADL evaluation and planning for discharge  - Provide patient education as appropriate  Outcome: Progressing     Problem: DISCHARGE PLANNING  Goal: Discharge to home or other facility with appropriate resources  Description: INTERVENTIONS:  - Identify barriers to discharge w/patient and caregiver  - Arrange for needed discharge resources and transportation as appropriate  - Identify discharge learning needs (meds, wound care, etc.)  - Arrange for interpretive services to assist at discharge as needed  - Refer to Case Management Department for coordinating discharge planning if the patient needs post-hospital services based on physician/advanced practitioner order or complex needs related to functional status, cognitive ability, or social support system  Outcome: Progressing     Problem: Knowledge Deficit  Goal: Patient/family/caregiver demonstrates understanding of disease process, treatment plan, medications, and discharge instructions  Description: Complete  learning assessment and assess knowledge base.  Interventions:  - Provide teaching at level of understanding  - Provide teaching via preferred learning methods  Outcome: Progressing

## 2024-10-23 NOTE — H&P
H&P - Hospitalist   Name: Prerna Garcia 29 y.o. female I MRN: [id number]  Unit/Bed#: -01 I Date of Admission: 10/22/2024   Date of Service: 10/23/2024 I Hospital Day: 0     Assessment & Plan  Pneumonia involving right lung  Presented with persistent productive cough and fever  Recently seen in emergency department, 10/16 was diagnosed with right-sided pneumonia and discharged on p.o. Augmentin  No improvement, despite being compliant with treatment    Did not meet SIRS or sepsis criteria  Noted to have temperature of 100.5 in ED  On room air     Procalcitonin x 1-negative  Lactic acid-normal  Normal WBC count  D dimer - normal   CRP and ESR elevated but denies any pleuritic chest pain     Chest x-ray repeated-with persistent right upper lobe opacity, final reading pending    Received IV ceftriaxone and azithromycin in ED, will continue with IV ceftriaxone every 24 hours  Trend procalcitonin  Follow respiratory panel 2  Follow urine antigens  Sputum culture Gram stain  Respiratory protocol  Scheduled nebs   Inpatient consult to pulmonology  Monitor hemodynamics, WBC/temperature curve  Supportive care  Symptomatic treatment for cough      VTE Pharmacologic Prophylaxis:   Moderate Risk (Score 3-4) - Pharmacological DVT Prophylaxis Ordered: enoxaparin (Lovenox).  Code Status: Level 1 - Full Code patient  Discussion with family:  not available.     Anticipated Length of Stay: Patient will be admitted on an observation basis with an anticipated length of stay of less than 2 midnights secondary to pneumonia.    History of Present Illness   Chief Complaint:   Chief Complaint   Patient presents with    Cough     Pt diagnosed with pneumonia Wednesday, still has 3 more antibiotics to take, but c/o chills and productive cough that is not improving with antibiotics.          Prerna Garcia is a 29 y.o. female with no significant past medical history, recently diagnosed with pneumonia on 10/16 and discharged on Augmentin  from ED, presented with persistent productive cough, fever and chills.  On my encounter, she is awake alert and hemodynamically stable on room air.  She does not meet SIRS or sepsis criteria.  However she is complaining of bouts of productive cough and is febrile.  Denies any sick contacts  Denies any other complaints.  Has been compliant with her Augmentin.  Confirms level 1 full CODE STATUS.  [facility] be [facility] for further management and care.    Review of Systems   Constitutional:  Positive for chills, fatigue and fever. Negative for activity change and appetite change.   HENT:  Positive for congestion.    Respiratory:  Positive for cough. Negative for chest tightness and shortness of breath.    Cardiovascular:  Negative for chest pain, palpitations and leg swelling.   Gastrointestinal:  Negative for abdominal pain.   Genitourinary:  Negative for dysuria and urgency.   Neurological:  Negative for dizziness, weakness and headaches.   Psychiatric/Behavioral:  Negative for agitation and confusion.        Historical Information   Past Medical History:   Diagnosis Date    Abnormal Pap smear of cervix     HPV (human papilloma virus) infection     Varicella     vacc x?     Past Surgical History:   Procedure Laterality Date     SECTION      DE  DELIVERY ONLY N/A 2016    Procedure:  SECTION ();  Surgeon: Mervin Brown MD;  Location: [facility] ;  Service: Obstetrics    DE  DELIVERY ONLY N/A 2019    Procedure:  SECTION () REPEAT;  Surgeon: Mervin Brown MD;  Location: [facility] ;  Service: Obstetrics    WISDOM TOOTH EXTRACTION       Social History     Tobacco Use    Smoking status: Former     Current packs/day: 0.00     Average packs/day: 1 pack/day for 3.0 years (3.0 ttl pk-yrs)     Types: Cigarettes     Start date: 2012     Quit date: 2015     Years since quittin.8    Smokeless tobacco: Never    Tobacco comments:     quit 3 yrs ago    Vaping Use    Vaping status: Never Used   Substance and Sexual Activity    Alcohol use: No    Drug use: No    Sexual activity: Yes     Partners: Male     Birth control/protection: None     E-Cigarette/Vaping    E-Cigarette Use Never User      E-Cigarette/Vaping Substances     Family History   Problem Relation Age of Onset    Asthma Brother     Other Brother         SMOKING    Cancer Maternal Grandfather         lung    Other Maternal Grandfather         smoker    Migraines Mother     Other Mother         SMOKING    Other Father         SMOKING    Heart disease Father         MI    Hyperlipidemia Father     Cancer Paternal Aunt         brst    Varicose Veins Maternal Grandmother     Hypertension Maternal Grandmother     Heart disease Son         murmur     Social History:  Marital Status: Single     Meds/Allergies   I have reviewed home medications with patient personally.  Prior to Admission medications    Medication Sig Start Date End Date Taking? Authorizing Provider   amoxicillin-clavulanate (AUGMENTIN) 875-125 mg per tablet Take 1 tablet by mouth every 12 (twelve) hours for 7 days 10/16/24 10/23/24  Nicola Cota MD     Allergies   Allergen Reactions    Adhesive [Medical Tape]        Objective :  Temp:  [98.5 °F (36.9 °C)-100.5 °F (38.1 °C)] 98.5 °F (36.9 °C)  HR:  [112-128] 112  BP: (105-122)/(66-72) 105/66  Resp:  [18] 18  SpO2:  [93 %-95 %] 95 %  O2 Device: None (Room air)    Physical Exam  Vitals reviewed.   Constitutional:       Appearance: Normal appearance.   HENT:      Head: Atraumatic.      Mouth/Throat:      Mouth: Mucous membranes are dry.   Cardiovascular:      Rate and Rhythm: Normal rate and regular rhythm.      Heart sounds: Normal heart sounds.   Pulmonary:      Effort: No respiratory distress.      Breath sounds: Rhonchi present.   Abdominal:      General: Bowel sounds are normal.      Tenderness: There is no abdominal tenderness.   Musculoskeletal:      Right lower leg: No edema.      Left  "lower leg: No edema.   Skin:     General: Skin is dry.      Capillary Refill: Capillary refill takes less than 2 seconds.   Neurological:      Mental Status: She is alert and oriented to person, place, and time. Mental status is at baseline.   Psychiatric:         Mood and Affect: Mood normal.          Lines/Drains:            Lab Results: I have reviewed the following results:  Results from last 7 days   Lab Units 10/22/24  2329   WBC Thousand/uL 6.24   HEMOGLOBIN g/dL 9.6*   HEMATOCRIT % 30.5*   PLATELETS Thousands/uL 265   SEGS PCT % 78*   LYMPHO PCT % 10*   MONO PCT % 7   EOS PCT % 5     Results from last 7 days   Lab Units 10/22/24  2329   SODIUM mmol/L 138   POTASSIUM mmol/L 3.8   CHLORIDE mmol/L 101   CO2 mmol/L 28   BUN mg/dL 5   CREATININE mg/dL 0.58*   ANION GAP mmol/L 9   CALCIUM mg/dL 9.2   ALBUMIN g/dL 3.7   TOTAL BILIRUBIN mg/dL 0.73   ALK PHOS U/L 40   ALT U/L 10   AST U/L 14   GLUCOSE RANDOM mg/dL 94             No results found for: \"HGBA1C\"  Results from last 7 days   Lab Units 10/22/24  2329   LACTIC ACID mmol/L 0.7   PROCALCITONIN ng/ml <0.05       Imaging Results Review: I reviewed radiology reports from this admission including: chest xray.  Other Study Results Review: No additional pertinent studies reviewed.    Administrative Statements   I have spent a total time of 75 minutes in caring for this patient on the day of the visit/encounter including Prognosis, Risks and benefits of tx options, Patient and family education, Importance of tx compliance, Counseling / Coordination of care, Documenting in the medical record, Reviewing / ordering tests, medicine, procedures  , Obtaining or reviewing history  , and Communicating with other healthcare professionals .    ** Please Note: This note has been constructed using a voice recognition system. **    "

## 2024-10-23 NOTE — ED PROVIDER NOTES
Time reflects when diagnosis was documented in both MDM as applicable and the Disposition within this note       Time User Action Codes Description Comment    10/23/2024 12:22 AM Sri Krueger Add [J18.9] Pneumonia involving right lung     10/23/2024 12:34 AM Juliana Henry Add [J18.9] Right upper lobe pneumonia     10/23/2024 12:34 AM Juliana Henry Modify [J18.9] Pneumonia involving right lung     10/23/2024 12:34 AM Juliana Henry Modify [J18.9] Right upper lobe pneumonia           ED Disposition       ED Disposition   Admit    Condition   Stable    Date/Time   Wed Oct 23, 2024 12:28 AM    Comment   Case was discussed with EDITH and the patient's admission status was agreed to be Admission Status: observation status to the service of EDITH Gates.               Assessment & Plan       Medical Decision Making  28 y/o female presents to the ED for evaluation of fevers, chills, and worsening cough in the setting of recently diagnosed pneumonia.  The patient was seen in the ED 6 days ago and diagnosed with a right upper lobe pneumonia.  She was started on a course of Augmentin and reports compliance with her antibiotic treatment.  She states that she has 3 doses left of her antibiotics but has had a persistent cough that appears to be worsening and she also reports intermittent fevers and chills.  She was concerned that her symptoms are not improving despite almost completing her antibiotic course, so she returned to the ER for evaluation.  She also noticed audible crackles that were new compared to her initial ER evaluation last week.  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  She denies any known recent sick contact.  No recent travel.  She denies chest pain, abdominal pain, nausea, vomiting, diarrhea, urinary symptoms, back pain, neck pain, headache, numbness, or weakness.    Vital signs reviewed.  See physical exam documentation for exam findings.   The patient appears to have failure of outpatient therapy regarding treatment of her previously diagnosed right upper lobe pneumonia.  Plan to obtain labs and repeat chest x-ray.  See ED course for independent interpretation of results. Normal WBC 6.24.  Normal platelet count 265.  Mild anemia with hemoglobin 9.6, down from 12.2 from last week, which most likely is in the setting of the patient's menses that started the day after her last ER visit.  She denies any other recent or current bleeding.  I suspect that the anemia is likely related to her recent menses.  No other suspicion for acute cause of anemia.  Labs otherwise within normal limits, normal lactic acid and procalcitonin, chemistry without any acute actionable abnormalities.  Chest x-ray on my interpretation shows more pronounced right upper lobe infiltrate when compared to prior chest radiograph.  Patient started on IV ceftriaxone and azithromycin for expanded coverage of her pneumonia.  Will also attempt to obtain sputum culture specimen.  I discussed findings and indications for admission with the patient and she is agreeable.  Case discussed with hospitalist for admission.    Amount and/or Complexity of Data Reviewed  Labs: ordered. Decision-making details documented in ED Course.  Radiology: ordered and independent interpretation performed. Decision-making details documented in ED Course.    Risk  OTC drugs.  Decision regarding hospitalization.        ED Course as of 10/23/24 0108   Tue Oct 22, 2024   2342 CBC and differential(!)  Normal WBC 6.24.  Normal platelet count 265.  Mild anemia with hemoglobin 9.6, down from 12.2 from last week, which most likely is in the setting of the patient's menses that started the day after her last ER visit.  She denies any other recent or current bleeding.   Wed Oct 23, 2024   0010 XR chest 2 views  Right upper lobe infiltrate, more pronounced compared to prior chest radiograph from 10/16/2024.   0010 LACTIC ACID:  0.7  Normal   0010 Procalcitonin: <0.05  Normal   0010 Comprehensive metabolic panel(!)  No acute actionable abnormality   0010 FLU/COVID Rapid Antigen (30 min. TAT) - Preferred screening test in ED  All WNL       Medications   azithromycin (ZITHROMAX) 500 mg in sodium chloride 0.9% 250mL IVPB 500 mg (has no administration in time range)   acetaminophen (TYLENOL) tablet 650 mg (has no administration in time range)   docusate sodium (COLACE) capsule 100 mg (has no administration in time range)   ondansetron (ZOFRAN) injection 4 mg (has no administration in time range)   benzonatate (TESSALON PERLES) capsule 100 mg (has no administration in time range)   cefTRIAXone (ROCEPHIN) IVPB (premix in dextrose) 1,000 mg 50 mL (1,000 mg Intravenous New Bag 10/23/24 0045)   enoxaparin (LOVENOX) subcutaneous injection 40 mg (has no administration in time range)   Hydrocod Addy-Chlorphe Addy ER (TUSSIONEX) ER suspension 5 mL (has no administration in time range)   guaiFENesin (MUCINEX) 12 hr tablet 600 mg (has no administration in time range)   acetaminophen (TYLENOL) tablet 975 mg (has no administration in time range)   acetaminophen (TYLENOL) tablet 975 mg (975 mg Oral Given 10/22/24 2323)       ED Risk Strat Scores                           SBIRT 22yo+      Flowsheet Row Most Recent Value   Initial Alcohol Screen: US AUDIT-C     1. How often do you have a drink containing alcohol? 0 Filed at: 10/22/2024 2254   2. How many drinks containing alcohol do you have on a typical day you are drinking?  0 Filed at: 10/22/2024 2254   3a. Male UNDER 65: How often do you have five or more drinks on one occasion? 0 Filed at: 10/22/2024 2254   3b. FEMALE Any Age, or MALE 65+: How often do you have 4 or more drinks on one occassion? 0 Filed at: 10/22/2024 2254   Audit-C Score 0 Filed at: 10/22/2024 2254   NELL: How many times in the past year have you...    Used an illegal drug or used a prescription medication for non-medical reasons? Never  Filed at: 10/22/2024 [id number]                            History of Present Illness       Chief Complaint   Patient presents with    Cough     Pt diagnosed with pneumonia Wednesday, still has 3 more antibiotics to take, but c/o chills and productive cough that is not improving with antibiotics.        Past Medical History:   Diagnosis Date    Abnormal Pap smear of cervix     HPV (human papilloma virus) infection     Varicella     vacc x?      Past Surgical History:   Procedure Laterality Date     SECTION      MI  DELIVERY ONLY N/A 2016    Procedure:  SECTION ();  Surgeon: Mervin Brown MD;  Location: [facility];  Service: Obstetrics    MI  DELIVERY ONLY N/A 2019    Procedure:  SECTION () REPEAT;  Surgeon: Mervin Brown MD;  Location: [facility];  Service: Obstetrics    WISDOM TOOTH EXTRACTION        Family History   Problem Relation Age of Onset    Asthma Brother     Other Brother         SMOKING    Cancer Maternal Grandfather         lung    Other Maternal Grandfather         smoker    Migraines Mother     Other Mother         SMOKING    Other Father         SMOKING    Heart disease Father         MI    Hyperlipidemia Father     Cancer Paternal Aunt         brst    Varicose Veins Maternal Grandmother     Hypertension Maternal Grandmother     Heart disease Son         murmur      Social History     Tobacco Use    Smoking status: Former     Current packs/day: 0.00     Average packs/day: 1 pack/day for 3.0 years (3.0 ttl pk-yrs)     Types: Cigarettes     Start date: 2012     Quit date: 2015     Years since quittin.8    Smokeless tobacco: Never    Tobacco comments:     quit 3 yrs ago   Vaping Use    Vaping status: Never Used   Substance Use Topics    Alcohol use: No    Drug use: No      E-Cigarette/Vaping    E-Cigarette Use Never User       E-Cigarette/Vaping Substances      I have reviewed and agree with the history as documented.     28 y/o female  presents to the ED for evaluation of fevers, chills, and worsening cough in the setting of recently diagnosed pneumonia.  The patient was seen in the ED 6 days ago and diagnosed with a right upper lobe pneumonia.  She was started on a course of Augmentin and reports compliance with her antibiotic treatment.  She states that she has 3 doses left of her antibiotics but has had a persistent cough that appears to be worsening and she also reports intermittent fevers and chills.  She was concerned that her symptoms are not improving despite almost completing her antibiotic course, so she returned to the ER for evaluation.  She also noticed audible crackles that were new compared to her initial ER evaluation last week.  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  She denies any known recent sick contact.  No recent travel.  She denies chest pain, abdominal pain, nausea, vomiting, diarrhea, urinary symptoms, back pain, neck pain, headache, numbness, or weakness.        Review of Systems   Constitutional:  Positive for chills and fever.   HENT:  Negative for congestion, rhinorrhea and sore throat.    Respiratory:  Positive for cough. Negative for shortness of breath.    Cardiovascular:  Negative for chest pain and palpitations.   Gastrointestinal:  Negative for abdominal pain, diarrhea, nausea and vomiting.   Genitourinary:  Negative for dysuria and hematuria.   Musculoskeletal:  Negative for back pain and neck pain.   Neurological:  Negative for dizziness, weakness, light-headedness, numbness and headaches.   All other systems reviewed and are negative.          Objective       ED Triage Vitals   Temperature Pulse Blood Pressure Respirations SpO2 Patient Position - Orthostatic VS   10/22/24 2250 10/22/24 2250 10/22/24 2250 10/22/24 2250 10/22/24 2250 10/23/24 0057   100.5 °F (38.1 °C) (!) 128 122/72 18 93 % Sitting      Temp Source Heart Rate Source BP  Location FiO2 (%) Pain Score    10/23/24 0057 -- 10/23/24 0057 -- 10/23/24 0100    Oral  Left arm  No Pain      Vitals      Date and Time Temp Pulse SpO2 Resp BP Pain Score FACES Pain Rating User   10/23/24 0100 -- -- -- -- -- No Pain -- KG   10/23/24 0057 98.5 °F (36.9 °C) 112 95 % 18 105/66 -- -- MQ   10/22/24 2250 100.5 °F (38.1 °C) 128 93 % 18 122/72 -- -- JLZ            Physical Exam  Vitals and nursing note reviewed.   Constitutional:       General: She is not in acute distress.     Appearance: Normal appearance. She is normal weight. She is not ill-appearing.   HENT:      Head: Normocephalic and atraumatic.      Right Ear: External ear normal.      Left Ear: External ear normal.      Nose: Nose normal. No congestion or rhinorrhea.      Mouth/Throat:      Mouth: Mucous membranes are moist.      Pharynx: Oropharynx is clear. No oropharyngeal exudate or posterior oropharyngeal erythema.   Eyes:      Extraocular Movements: Extraocular movements intact.      Conjunctiva/sclera: Conjunctivae normal.      Pupils: Pupils are equal, round, and reactive to light.   Cardiovascular:      Rate and Rhythm: Regular rhythm. Tachycardia present.      Pulses: Normal pulses.      Heart sounds: Normal heart sounds. No murmur heard.  Pulmonary:      Effort: Pulmonary effort is normal. No respiratory distress.      Breath sounds: Normal breath sounds. No wheezing or rales.      Comments: Coughing noted.  No tachypnea.  No increased work of breathing.  No respiratory distress.  Crackles on auscultation of the right upper lung field.  Abdominal:      General: Abdomen is flat. Bowel sounds are normal. There is no distension.      Palpations: Abdomen is soft.      Tenderness: There is no abdominal tenderness. There is no right CVA tenderness, left CVA tenderness or guarding.   Musculoskeletal:         General: No swelling or tenderness. Normal range of motion.      Cervical back: Normal range of motion and neck supple. No tenderness.    Skin:     General: Skin is warm and dry.      Capillary Refill: Capillary refill takes less than 2 seconds.   Neurological:      General: No focal deficit present.      Mental Status: She is alert and oriented to person, place, and time.         Results Reviewed       Procedure Component Value Units Date/Time    hCG, serum, qualitative [[id number]]  (Normal) Collected: 10/22/24 2329    Lab Status: Final result Specimen: Blood from Arm, Right Updated: 10/23/24 0105     Preg, Serum Negative    TSH, 3rd generation [[id number]]  (Normal) Collected: 10/22/24 2329    Lab Status: Final result Specimen: Blood from Arm, Right Updated: 10/23/24 0105     TSH 3RD GENERATON 1.725 uIU/mL     Sedimentation rate, automated [[id number]]  (Abnormal) Collected: 10/22/24 2329    Lab Status: Final result Specimen: Blood from Arm, Right Updated: 10/23/24 0052     Sed Rate 54 mm/hour     C-reactive protein [[id number]]  (Abnormal) Collected: 10/22/24 2329    Lab Status: Final result Specimen: Blood from Arm, Right Updated: 10/23/24 0049     CRP 51.4 mg/L     HS Troponin 0hr (reflex protocol) [[id number]] Collected: 10/23/24 0040    Lab Status: In process Specimen: Blood from Arm, Right Updated: 10/23/24 0044    Respiratory Panel 2.1(RP2)with COVID19 [[id number]] Collected: 10/23/24 0040    Lab Status: In process Specimen: Nasopharyngeal Swab Updated: 10/23/24 0044    Strep Pneumoniae, Urine [[id number]]     Lab Status: No result Specimen: Urine, Other     Legionella antigen, Urine [[id number]]     Lab Status: No result Specimen: Urine, Other     Procalcitonin [[id number]]  (Normal) Collected: 10/22/24 2329    Lab Status: Final result Specimen: Blood from Arm, Right Updated: 10/23/24 0000     Procalcitonin <0.05 ng/ml     FLU/COVID Rapid Antigen (30 min. TAT) - Preferred screening test in ED [[id number]]  (Normal) Collected: 10/22/24 2329    Lab Status: Final result Specimen: Nares from Nose Updated: 10/22/24 2353     SARS COV Rapid Antigen  Negative     Influenza A Rapid Antigen Negative     Influenza B Rapid Antigen Negative    Narrative:      This test has been performed using the Quidel Valencia 2 FLU+SARS Antigen test under the Emergency Use Authorization (EUA). This test has been validated by the  and verified by the performing laboratory. The Valencia uses lateral flow immunofluorescent sandwich assay to detect SARS-COV, Influenza A and Influenza B Antigen.     The Quidel Valencia 2 SARS Antigen test does not differentiate between SARS-CoV and SARS-CoV-2.     Negative results are presumptive and may be confirmed with a molecular assay, if necessary, for patient management. Negative results do not rule out SARS-CoV-2 or influenza infection and should not be used as the sole basis for treatment or patient management decisions. A negative test result may occur if the level of antigen in a sample is below the limit of detection of this test.     Positive results are indicative of the presence of viral antigens, but do not rule out bacterial infection or co-infection with other viruses.     All test results should be used as an adjunct to clinical observations and other information available to the provider.    FOR PEDIATRIC PATIENTS - copy/paste COVID Guidelines URL to browser: [email or web address]    Lactic acid, plasma (w/reflex if result > 2.0) [[id number]]  (Normal) Collected: 10/22/24 2329    Lab Status: Final result Specimen: Blood from Arm, Right Updated: 10/22/24 2353     LACTIC ACID 0.7 mmol/L     Narrative:      Result may be elevated if tourniquet was used during collection.    Comprehensive metabolic panel [[id number]]  (Abnormal) Collected: 10/22/24 2329    Lab Status: Final result Specimen: Blood from Arm, Right Updated: 10/22/24 2353     Sodium 138 mmol/L      Potassium 3.8 mmol/L      Chloride 101 mmol/L      CO2 28 mmol/L      ANION GAP 9 mmol/L      BUN 5 mg/dL      Creatinine  0.58 mg/dL      Glucose 94 mg/dL      Calcium 9.2 mg/dL      AST 14 U/L      ALT 10 U/L      Alkaline Phosphatase 40 U/L      Total Protein 7.1 g/dL      Albumin 3.7 g/dL      Total Bilirubin 0.73 mg/dL      eGFR 124 ml/min/1.73sq m     Narrative:      National Kidney Disease Foundation guidelines for Chronic Kidney Disease (CKD):     Stage 1 with normal or high GFR (GFR > 90 mL/min/1.73 square meters)    Stage 2 Mild CKD (GFR = 60-89 mL/min/1.73 square meters)    Stage 3A Moderate CKD (GFR = 45-59 mL/min/1.73 square meters)    Stage 3B Moderate CKD (GFR = 30-44 mL/min/1.73 square meters)    Stage 4 Severe CKD (GFR = 15-29 mL/min/1.73 square meters)    Stage 5 End Stage CKD (GFR <15 mL/min/1.73 square meters)  Note: GFR calculation is accurate only with a steady state creatinine    CBC and differential [[id number]]  (Abnormal) Collected: 10/22/24 2329    Lab Status: Final result Specimen: Blood from Arm, Right Updated: 10/22/24 2335     WBC 6.24 Thousand/uL      RBC 3.67 Million/uL      Hemoglobin 9.6 g/dL      Hematocrit 30.5 %      MCV 83 fL      MCH 26.2 pg      MCHC 31.5 g/dL      RDW 13.2 %      MPV 9.6 fL      Platelets 265 Thousands/uL      nRBC 0 /100 WBCs      Segmented % 78 %      Immature Grans % 0 %      Lymphocytes % 10 %      Monocytes % 7 %      Eosinophils Relative 5 %      Basophils Relative 0 %      Absolute Neutrophils 4.84 Thousands/µL      Absolute Immature Grans 0.02 Thousand/uL      Absolute Lymphocytes 0.65 Thousands/µL      Absolute Monocytes 0.42 Thousand/µL      Eosinophils Absolute 0.29 Thousand/µL      Basophils Absolute 0.02 Thousands/µL     Sputum culture and Gram stain [[id number]]     Lab Status: No result Specimen: Expectorated Sputum             XR chest 2 views   ED Interpretation by Henry Andrews MD (10/23 0010)   Right upper lobe infiltrate, more pronounced compared to prior chest radiograph from 10/16/2024.          Procedures    ED Medication and Procedure Management   Prior to  Admission Medications   Prescriptions Last Dose Informant Patient Reported? Taking?   amoxicillin-clavulanate (AUGMENTIN) 875-125 mg per tablet 10/23/2024  No Yes   Sig: Take 1 tablet by mouth every 12 (twelve) hours for 7 days      Facility-Administered Medications: None     Current Discharge Medication List        CONTINUE these medications which have NOT CHANGED    Details   amoxicillin-clavulanate (AUGMENTIN) 875-125 mg per tablet Take 1 tablet by mouth every 12 (twelve) hours for 7 days  Qty: 14 tablet, Refills: 0    Associated Diagnoses: Pneumonia           No discharge procedures on file.  ED SEPSIS DOCUMENTATION   Time reflects when diagnosis was documented in both MDM as applicable and the Disposition within this note       Time User Action Codes Description Comment    10/23/2024 12:22 AM Sri Krueger Add [J18.9] Pneumonia involving right lung     10/23/2024 12:34 AM Henry Andrews [J18.9] Right upper lobe pneumonia     10/23/2024 12:34 AM Henry Andrews [J18.9] Pneumonia involving right lung     10/23/2024 12:34 AM Henry Andrews [J18.9] Right upper lobe pneumonia                  Henry Andrews MD  10/23/24 0108

## 2024-10-23 NOTE — ASSESSMENT & PLAN NOTE
Presented with persistent productive cough and fever. Recently seen in emergency department, 10/16 was diagnosed with right-sided pneumonia and discharged on Augmentin. No improvement, despite being compliant with treatment  Did not meet SIRS or sepsis criteria  Noted to have temperature of 100.5 in ED  D dimer negative   COVID/FLU/RSV negative. RP2 pending  CRP and ESR elevated but denies any pleuritic chest pain   CXR repeated and shows persistent right upper lobe opacity, final reading pending  Procalcitonin negative x1, trend  Supportive care with tylenol, decongestant, cough suppressant  Check sputum culture and urine antigens  Continue ceftriaxone and azithromycin (D2)  Currently oxygenating well on RA  Pulmonology consulted, recommendations are appreciated

## 2024-10-23 NOTE — ASSESSMENT & PLAN NOTE
Hemoglobin 9.1. Per chart review patient with history noted  Denies any active bleeding  Check B12, folate, iron panel  Monitor daily CBC

## 2024-10-23 NOTE — ASSESSMENT & PLAN NOTE
Presented with persistent productive cough and fever  Recently seen in emergency department, 10/16 was diagnosed with right-sided pneumonia and discharged on p.o. Augmentin  No improvement, despite being compliant with treatment    Did not meet SIRS or sepsis criteria  Noted to have temperature of 100.5 in ED  On room air     Procalcitonin x 1-negative  Lactic acid-normal  Normal WBC count  D dimer - normal   CRP and ESR elevated but denies any pleuritic chest pain     Chest x-ray repeated-with persistent right upper lobe opacity, final reading pending    Received IV ceftriaxone and azithromycin in ED, will continue with IV ceftriaxone every 24 hours  Trend procalcitonin  Follow respiratory panel 2  Follow urine antigens  Sputum culture Gram stain  Respiratory protocol  Scheduled nebs   Inpatient consult to pulmonology  Monitor hemodynamics, WBC/temperature curve  Supportive care  Symptomatic treatment for cough

## 2024-10-23 NOTE — CONSULTS
ASSESSMENT AND PLAN:     1.  Pneumonia involving right lung: She has features of right lung predominantly right upper lobe.  She has no leukocytosis and her procalcitonin has been low.  Her chest auscultation revealed occasional crackles on the right side and occasional rhonchi.  She is empirically started on ceftriaxone and azithromycin now.  She may have viral pneumonia.  Continue antibiotics for now.  Recommend a CT scan of the chest without contrast for further evaluation.  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  Currently she is not needing any oxygen supplementation.    2.  Anemia: Her hemoglobin was found to be 9.1.  The etiology of this pneumonia is not clear at this time.  She has iron deficiency.    I had a long discussion with her and answered all her questions..    Discussed with the primary team.    Thank you for allowing me to participate in the care of the patient.      History of Present Illness     Prerna Garcia is a 29 y.o. female with no significant past medical history presented with shortness of breath and cough with green phlegm and occasional wheezing.  She states that her symptoms started few days back and was treated with Augmentin.  She was not getting out of breath.  She felt feverish and had some chills..  No chest pain no palpitations.  Her chest imaging showed right upper lobe infiltrates with some confluence suggestive of pneumonia.  She was COVID influenza and RSV negative.  Currently she has been started on treatment with ceftriaxone and azithromycin.  Her respiratory virus panel to came negative.  Urinary and Legionella antigens were also negative.  She likely has viral pneumonia.  She also mentioned that she is a cystic fibrosis carrier.  He is a never smoker and do not consume alcohol.  No occupational exposure to chemicals or asbestos.    Review of Systems   Constitutional:  Positive for fatigue and fever. Negative for appetite change and chills.   HENT:   Negative for hearing loss, rhinorrhea, sneezing, sore throat and trouble swallowing.    Respiratory:  Positive for cough, shortness of breath and wheezing. Negative for chest tightness.    Cardiovascular:  Negative for chest pain, palpitations and leg swelling.   Gastrointestinal:  Negative for abdominal pain, constipation, diarrhea, nausea and vomiting.   Genitourinary:  Negative for dysuria, frequency and urgency.   Musculoskeletal:  Negative for arthralgias and back pain.   Skin:  Negative for rash.   Allergic/Immunologic: Negative for environmental allergies.   Neurological:  Positive for light-headedness. Negative for dizziness, syncope and headaches.   Psychiatric/Behavioral:  Negative for agitation and confusion. The patient is nervous/anxious.        Historical Information   I have reviewed the patient's PMH, PSH, Social History, Family History, Meds, and Allergies  Tobacco History: Do not smoke  Occupational History: No history of occupational exposure to chemicals or asbestos.  Family History: Brother has asthma.    Objective :  Temp:  [98.5 °F (36.9 °C)-100.5 °F (38.1 °C)] 99 °F (37.2 °C)  HR:  [] 134  BP: (105-122)/(66-83) 106/83  Resp:  [18-20] 20  SpO2:  [93 %-97 %] 97 %  O2 Device: None (Room air)    Physical Exam  Vitals reviewed.   Constitutional:       General: She is not in acute distress.     Appearance: She is not ill-appearing, toxic-appearing or diaphoretic.   HENT:      Head: Normocephalic.      Mouth/Throat:      Mouth: Mucous membranes are moist.   Eyes:      General: No scleral icterus.     Conjunctiva/sclera: Conjunctivae normal.   Cardiovascular:      Rate and Rhythm: Normal rate and regular rhythm.      Heart sounds: Normal heart sounds. No murmur heard.  Pulmonary:      Effort: Pulmonary effort is normal. No respiratory distress.      Breath sounds: No stridor. Rales (Crackles right upper zone posteriorly) present. No wheezing or rhonchi (Right side).   Chest:      Chest wall: No  tenderness.   Abdominal:      General: Bowel sounds are normal.      Palpations: Abdomen is soft.      Tenderness: There is no abdominal tenderness. There is no guarding.   Musculoskeletal:      Cervical back: Neck supple. No rigidity.      Right lower leg: No edema.      Left lower leg: No edema.   Lymphadenopathy:      Cervical: No cervical adenopathy.   Skin:     Coloration: Skin is not jaundiced or pale.      Findings: No rash.   Neurological:      Mental Status: She is alert and oriented to person, place, and time.   Psychiatric:         Mood and Affect: Mood normal.         Behavior: Behavior normal.         Thought Content: Thought content normal.         Judgment: Judgment normal.           Lab Results: I have reviewed the following results:  .     10/22/24  2329 10/23/24  0040 10/23/24  0228 10/23/24  0447   WBC 6.24  --   --  5.27   HGB 9.6*  --   --  9.1*   HCT 30.5*  --   --  28.3*     --   --  254   SODIUM 138  --   --  140   K 3.8  --   --  3.8     --   --  104   CO2 28  --   --  26   BUN 5  --   --  4*   CREATININE 0.58*  --   --  0.57*   GLUC 94  --   --  91   MG  --   --   --  1.7*   AST 14  --   --   --    ALT 10  --   --   --    ALB 3.7  --   --   --    TBILI 0.73  --   --   --    ALKPHOS 40  --   --   --    HSTNI0  --  <2  --   --    HSTNI2  --   --  <2  --    LACTICACID 0.7  --   --   --      ABG: No new results in last 24 hours.    Imaging Results Review: I personally reviewed the following image studies in PACS and associated radiology reports: chest xray. My interpretation of the radiology images/reports is: Infiltrates right upper lobe..  Other Study Results Review: Other studies reviewed include: Procalcitonin low.  Lactic acid normal.  PFT Results Reviewed: Not available    VTE Prophylaxis: Enoxaparin (Lovenox)

## 2024-10-23 NOTE — ASSESSMENT & PLAN NOTE
ECG: Sinus tachycardia () without acute ischemic changes  Denies any chest pain  Negative troponin x3  In the setting of acute infection  Will start on IVF for hydration  Continue to monitor

## 2024-10-23 NOTE — PROGRESS NOTES
Progress Note - Hospitalist   Name: Prerna Garcia 29 y.o. female I MRN: [id number]  Unit/Bed#: -01 I Date of Admission: 10/22/2024   Date of Service: 10/23/2024 I Hospital Day: 0    Assessment & Plan  Pneumonia involving right lung  Presented with persistent productive cough and fever. Recently seen in emergency department, 10/16 was diagnosed with right-sided pneumonia and discharged on Augmentin. No improvement, despite being compliant with treatment  Did not meet SIRS or sepsis criteria  Noted to have temperature of 100.5 in ED  D dimer negative   COVID/FLU/RSV negative. RP2 pending  CRP and ESR elevated but denies any pleuritic chest pain   CXR repeated and shows persistent right upper lobe opacity, final reading pending  Procalcitonin negative x1, trend  Supportive care with tylenol, decongestant, cough suppressant  Check sputum culture and urine antigens  Continue ceftriaxone and azithromycin (D2)  Currently oxygenating well on RA  Pulmonology consulted, recommendations are appreciated    Anemia  Hemoglobin 9.1. Per chart review patient with history noted  Denies any active bleeding  Check B12, folate, iron panel  Monitor daily CBC  Hypomagnesemia  Magnesium 1.7 this morning  Supplementation with 2g IV mag  Recheck AM labs  Tachycardia  ECG: Sinus tachycardia () without acute ischemic changes  Denies any chest pain  Negative troponin x3  In the setting of acute infection  Will start on IVF for hydration  Continue to monitor    VTE Pharmacologic Prophylaxis:   Moderate Risk (Score 3-4) - Pharmacological DVT Prophylaxis Ordered: enoxaparin (Lovenox).    Mobility:   Basic Mobility Inpatient Raw Score: 24  JH-HLM Goal: 8: Walk 250 feet or more  JH-HLM Achieved: 8: Walk 250 feet ot more  JH-HLM Goal achieved. Continue to encourage appropriate mobility.    Patient Centered Rounds: I performed bedside rounds with nursing staff today.   Discussions with Specialists or Other Care Team Provider: Nursing,  Pulmonology    Education and Discussions with Family / Patient:  Declines update, reports she will call her .     Current Length of Stay: 0 day(s)  Current Patient Status: Observation   Certification Statement: The patient will continue to require additional inpatient hospital stay due to Pneumonia  Discharge Plan: Anticipate discharge in 24-48 hrs to home.    Code Status: Level 1 - Full Code    Subjective   Patient lying in bed this morning with active coughing. Denies any chest pain. Reports ongoing fatigue and cough. Denies any SOB.     Objective :  Temp:  [98.5 °F (36.9 °C)-100.5 °F (38.1 °C)] 99.7 °F (37.6 °C)  HR:  [] 114  BP: (105-122)/(66-72) 109/67  Resp:  [18-20] 20  SpO2:  [93 %-97 %] 97 %  O2 Device: None (Room air)    Body mass index is 20.25 kg/m².     Input and Output Summary (last 24 hours):   No intake or output data in the 24 hours ending 10/23/24 0930    Physical Exam  Vitals and nursing note reviewed.   Constitutional:       General: She is not in acute distress.     Appearance: She is well-developed.   HENT:      Head: Normocephalic and atraumatic.   Eyes:      Conjunctiva/sclera: Conjunctivae normal.   Cardiovascular:      Rate and Rhythm: Regular rhythm. Tachycardia present.      Heart sounds: No murmur heard.  Pulmonary:      Effort: Pulmonary effort is normal. No respiratory distress.      Breath sounds: Rales (RUL) present.   Abdominal:      Palpations: Abdomen is soft.      Tenderness: There is no abdominal tenderness.   Musculoskeletal:         General: No swelling.      Cervical back: Neck supple.   Skin:     General: Skin is warm and dry.      Capillary Refill: Capillary refill takes less than 2 seconds.   Neurological:      Mental Status: She is alert.   Psychiatric:         Mood and Affect: Mood normal.           Lines/Drains:              Lab Results: I have reviewed the following results:   Results from last 7 days   Lab Units 10/23/24  5406 10/22/24  1106   WBC  Thousand/uL 5.27 6.24   HEMOGLOBIN g/dL 9.1* 9.6*   HEMATOCRIT % 28.3* 30.5*   PLATELETS Thousands/uL 254 265   SEGS PCT %  --  78*   LYMPHO PCT %  --  10*   MONO PCT %  --  7   EOS PCT %  --  5     Results from last 7 days   Lab Units 10/23/24  0447 10/22/24  2329   SODIUM mmol/L 140 138   POTASSIUM mmol/L 3.8 3.8   CHLORIDE mmol/L 104 101   CO2 mmol/L 26 28   BUN mg/dL 4* 5   CREATININE mg/dL 0.57* 0.58*   ANION GAP mmol/L 10 9   CALCIUM mg/dL 8.4 9.2   ALBUMIN g/dL  --  3.7   TOTAL BILIRUBIN mg/dL  --  0.73   ALK PHOS U/L  --  40   ALT U/L  --  10   AST U/L  --  14   GLUCOSE RANDOM mg/dL 91 94                 Results from last 7 days   Lab Units 10/22/24  2329   LACTIC ACID mmol/L 0.7   PROCALCITONIN ng/ml <0.05       Recent Cultures (last 7 days):         Imaging Results Review: No pertinent imaging studies reviewed.  Other Study Results Review: EKG was reviewed.     Last 24 Hours Medication List:     Current Facility-Administered Medications:     acetaminophen (TYLENOL) tablet 650 mg, Q6H PRN    azithromycin (ZITHROMAX) tablet 500 mg, Q24H    benzonatate (TESSALON PERLES) capsule 100 mg, TID PRN    cefTRIAXone (ROCEPHIN) IVPB (premix in dextrose) 1,000 mg 50 mL, Q24H, Last Rate: 1,000 mg (10/23/24 0045)    docusate sodium (COLACE) capsule 100 mg, BID    enoxaparin (LOVENOX) subcutaneous injection 40 mg, Daily    guaiFENesin (MUCINEX) 12 hr tablet 600 mg, Q12H ABNER    Hydrocod Addy-Chlorphe Addy ER (TUSSIONEX) ER suspension 5 mL, Q12H    levalbuterol (XOPENEX) inhalation solution 1.25 mg, TID    ondansetron (ZOFRAN) injection 4 mg, Q6H PRN    sodium chloride 0.9 % infusion, Continuous    Administrative Statements   Today, Patient Was Seen By: Kenia Altamirano PA-C  I have spent a total time of 30 minutes in caring for this patient on the day of the visit/encounter including Diagnostic results, Instructions for management, Patient and family education, Importance of tx compliance, Counseling / Coordination of care,  Documenting in the medical record, Reviewing / ordering tests, medicine, procedures  , Obtaining or reviewing history  , and Communicating with other healthcare professionals .    **Please Note: This note may have been constructed using a voice recognition system.**

## 2024-10-24 ENCOUNTER — APPOINTMENT (OUTPATIENT)
Dept: NON INVASIVE DIAGNOSTICS | Facility: HOSPITAL | Age: 29
DRG: 139 | End: 2024-10-24
Payer: COMMERCIAL

## 2024-10-24 LAB
ANION GAP SERPL CALCULATED.3IONS-SCNC: 10 MMOL/L (ref 4–13)
AORTIC ROOT: 2.4 CM
APICAL FOUR CHAMBER EJECTION FRACTION: 65 %
BSA FOR ECHO PROCEDURE: 1.56 M2
BUN SERPL-MCNC: 4 MG/DL (ref 5–25)
CALCIUM SERPL-MCNC: 8.6 MG/DL (ref 8.4–10.2)
CHLORIDE SERPL-SCNC: 104 MMOL/L (ref 96–108)
CO2 SERPL-SCNC: 26 MMOL/L (ref 21–32)
CREAT SERPL-MCNC: 0.59 MG/DL (ref 0.6–1.3)
ERYTHROCYTE [DISTWIDTH] IN BLOOD BY AUTOMATED COUNT: 14.5 % (ref 11.6–15.1)
FRACTIONAL SHORTENING: 30 (ref 28–44)
GFR SERPL CREATININE-BSD FRML MDRD: 123 ML/MIN/1.73SQ M
GLUCOSE SERPL-MCNC: 89 MG/DL (ref 65–140)
HCT VFR BLD AUTO: 23.8 % (ref 34.8–46.1)
HGB BLD-MCNC: 8.7 G/DL (ref 11.5–15.4)
INTERVENTRICULAR SEPTUM IN DIASTOLE (PARASTERNAL SHORT AXIS VIEW): 0.6 CM
INTERVENTRICULAR SEPTUM: 0.6 CM (ref 0.6–1.1)
LAAS-AP2: 9.6 CM2
LAAS-AP4: 9.3 CM2
LEFT ATRIUM SIZE: 2.7 CM
LEFT ATRIUM VOLUME (MOD BIPLANE): 20 ML
LEFT ATRIUM VOLUME INDEX (MOD BIPLANE): 12.8 ML/M2
LEFT INTERNAL DIMENSION IN SYSTOLE: 2.8 CM (ref 2.1–4)
LEFT VENTRICULAR INTERNAL DIMENSION IN DIASTOLE: 4 CM (ref 3.5–6)
LEFT VENTRICULAR POSTERIOR WALL IN END DIASTOLE: 0.7 CM
LEFT VENTRICULAR STROKE VOLUME: 41 ML
LVSV (TEICH): 41 ML
MAGNESIUM SERPL-MCNC: 1.9 MG/DL (ref 1.9–2.7)
MCH RBC QN AUTO: 32.3 PG (ref 26.8–34.3)
MCHC RBC AUTO-ENTMCNC: 36.6 G/DL (ref 31.4–37.4)
MCV RBC AUTO: 89 FL (ref 82–98)
PLATELET # BLD AUTO: 269 THOUSANDS/UL (ref 149–390)
PMV BLD AUTO: 9.9 FL (ref 8.9–12.7)
POTASSIUM SERPL-SCNC: 3.8 MMOL/L (ref 3.5–5.3)
PROCALCITONIN SERPL-MCNC: 1.24 NG/ML
RBC # BLD AUTO: 2.69 MILLION/UL (ref 3.81–5.12)
RIGHT ATRIUM AREA SYSTOLE A4C: 7.2 CM2
RIGHT VENTRICLE ID DIMENSION: 2.8 CM
SL CV LEFT ATRIUM LENGTH A2C: 4.1 CM
SL CV LV EF: 70
SL CV PED ECHO LEFT VENTRICLE DIASTOLIC VOLUME (MOD BIPLANE) 2D: 70 ML
SL CV PED ECHO LEFT VENTRICLE SYSTOLIC VOLUME (MOD BIPLANE) 2D: 29 ML
SODIUM SERPL-SCNC: 140 MMOL/L (ref 135–147)
TRICUSPID ANNULAR PLANE SYSTOLIC EXCURSION: 2.2 CM
WBC # BLD AUTO: 5.7 THOUSAND/UL (ref 4.31–10.16)

## 2024-10-24 PROCEDURE — 87040 BLOOD CULTURE FOR BACTERIA: CPT | Performed by: INTERNAL MEDICINE

## 2024-10-24 PROCEDURE — 99232 SBSQ HOSP IP/OBS MODERATE 35: CPT | Performed by: INTERNAL MEDICINE

## 2024-10-24 PROCEDURE — 93306 TTE W/DOPPLER COMPLETE: CPT | Performed by: INTERNAL MEDICINE

## 2024-10-24 PROCEDURE — 93306 TTE W/DOPPLER COMPLETE: CPT

## 2024-10-24 PROCEDURE — 87205 SMEAR GRAM STAIN: CPT | Performed by: EMERGENCY MEDICINE

## 2024-10-24 PROCEDURE — 84145 PROCALCITONIN (PCT): CPT | Performed by: INTERNAL MEDICINE

## 2024-10-24 PROCEDURE — 80048 BASIC METABOLIC PNL TOTAL CA: CPT | Performed by: INTERNAL MEDICINE

## 2024-10-24 PROCEDURE — 87070 CULTURE OTHR SPECIMN AEROBIC: CPT | Performed by: EMERGENCY MEDICINE

## 2024-10-24 PROCEDURE — 83735 ASSAY OF MAGNESIUM: CPT | Performed by: INTERNAL MEDICINE

## 2024-10-24 PROCEDURE — 94640 AIRWAY INHALATION TREATMENT: CPT

## 2024-10-24 PROCEDURE — 85027 COMPLETE CBC AUTOMATED: CPT | Performed by: INTERNAL MEDICINE

## 2024-10-24 PROCEDURE — 94760 N-INVAS EAR/PLS OXIMETRY 1: CPT

## 2024-10-24 PROCEDURE — 87081 CULTURE SCREEN ONLY: CPT | Performed by: INTERNAL MEDICINE

## 2024-10-24 RX ORDER — POLYETHYLENE GLYCOL 3350 17 G/17G
17 POWDER, FOR SOLUTION ORAL DAILY
Status: DISCONTINUED | OUTPATIENT
Start: 2024-10-24 | End: 2024-10-26 | Stop reason: HOSPADM

## 2024-10-24 RX ORDER — FERROUS SULFATE 325(65) MG
325 TABLET ORAL
Status: DISCONTINUED | OUTPATIENT
Start: 2024-10-24 | End: 2024-10-26 | Stop reason: HOSPADM

## 2024-10-24 RX ORDER — MAGNESIUM SULFATE 1 G/100ML
1 INJECTION INTRAVENOUS ONCE
Status: COMPLETED | OUTPATIENT
Start: 2024-10-24 | End: 2024-10-24

## 2024-10-24 RX ORDER — POTASSIUM CHLORIDE 1500 MG/1
20 TABLET, EXTENDED RELEASE ORAL ONCE
Status: COMPLETED | OUTPATIENT
Start: 2024-10-24 | End: 2024-10-24

## 2024-10-24 RX ORDER — VANCOMYCIN HYDROCHLORIDE 1 G/200ML
17.5 INJECTION, SOLUTION INTRAVENOUS EVERY 8 HOURS
Status: DISCONTINUED | OUTPATIENT
Start: 2024-10-24 | End: 2024-10-26 | Stop reason: HOSPADM

## 2024-10-24 RX ORDER — VANCOMYCIN HYDROCHLORIDE 750 MG/150ML
15 INJECTION, SOLUTION INTRAVENOUS EVERY 12 HOURS
Status: DISCONTINUED | OUTPATIENT
Start: 2024-10-24 | End: 2024-10-24

## 2024-10-24 RX ORDER — VANCOMYCIN HYDROCHLORIDE 750 MG/150ML
15 INJECTION, SOLUTION INTRAVENOUS EVERY 12 HOURS
Status: CANCELLED | OUTPATIENT
Start: 2024-10-24

## 2024-10-24 RX ORDER — GUAIFENESIN/DEXTROMETHORPHAN 100-10MG/5
10 SYRUP ORAL EVERY 4 HOURS PRN
Status: DISCONTINUED | OUTPATIENT
Start: 2024-10-24 | End: 2024-10-26 | Stop reason: HOSPADM

## 2024-10-24 RX ADMIN — HYDROCODONE POLISTIREX AND CHLORPHENIRAMINE POLISTIREX 5 ML: 10; 8 SUSPENSION, EXTENDED RELEASE ORAL at 12:47

## 2024-10-24 RX ADMIN — POTASSIUM CHLORIDE 20 MEQ: 1500 TABLET, EXTENDED RELEASE ORAL at 12:47

## 2024-10-24 RX ADMIN — LEVALBUTEROL HYDROCHLORIDE 1.25 MG: 1.25 SOLUTION RESPIRATORY (INHALATION) at 20:27

## 2024-10-24 RX ADMIN — LORATADINE 10 MG: 10 TABLET ORAL at 09:11

## 2024-10-24 RX ADMIN — GUAIFENESIN 600 MG: 600 TABLET ORAL at 20:07

## 2024-10-24 RX ADMIN — SODIUM CHLORIDE 75 ML/HR: 0.9 INJECTION, SOLUTION INTRAVENOUS at 09:10

## 2024-10-24 RX ADMIN — GUAIFENESIN AND DEXTROMETHORPHAN 10 ML: 100; 10 SYRUP ORAL at 23:50

## 2024-10-24 RX ADMIN — ACETAMINOPHEN 650 MG: 325 TABLET, FILM COATED ORAL at 19:43

## 2024-10-24 RX ADMIN — VANCOMYCIN HYDROCHLORIDE 1250 MG: 1 INJECTION, POWDER, LYOPHILIZED, FOR SOLUTION INTRAVENOUS at 09:13

## 2024-10-24 RX ADMIN — AZITHROMYCIN 500 MG: 500 TABLET, FILM COATED ORAL at 05:20

## 2024-10-24 RX ADMIN — DOCUSATE SODIUM 100 MG: 100 CAPSULE, LIQUID FILLED ORAL at 17:26

## 2024-10-24 RX ADMIN — POLYETHYLENE GLYCOL 3350 17 G: 17 POWDER, FOR SOLUTION ORAL at 09:21

## 2024-10-24 RX ADMIN — BENZONATATE 100 MG: 100 CAPSULE ORAL at 20:06

## 2024-10-24 RX ADMIN — MAGNESIUM SULFATE HEPTAHYDRATE 1 G: 1 INJECTION, SOLUTION INTRAVENOUS at 12:47

## 2024-10-24 RX ADMIN — GUAIFENESIN 600 MG: 600 TABLET ORAL at 09:10

## 2024-10-24 RX ADMIN — LEVALBUTEROL HYDROCHLORIDE 1.25 MG: 1.25 SOLUTION RESPIRATORY (INHALATION) at 14:33

## 2024-10-24 RX ADMIN — FERROUS SULFATE TAB 325 MG (65 MG ELEMENTAL FE) 325 MG: 325 (65 FE) TAB at 09:11

## 2024-10-24 RX ADMIN — DOCUSATE SODIUM 100 MG: 100 CAPSULE, LIQUID FILLED ORAL at 09:11

## 2024-10-24 RX ADMIN — ENOXAPARIN SODIUM 40 MG: 40 INJECTION SUBCUTANEOUS at 09:08

## 2024-10-24 RX ADMIN — VANCOMYCIN HYDROCHLORIDE 1000 MG: 1 INJECTION, SOLUTION INTRAVENOUS at 17:26

## 2024-10-24 RX ADMIN — CEFTRIAXONE 1000 MG: 1 INJECTION, SOLUTION INTRAVENOUS at 23:50

## 2024-10-24 RX ADMIN — LEVALBUTEROL HYDROCHLORIDE 1.25 MG: 1.25 SOLUTION RESPIRATORY (INHALATION) at 09:36

## 2024-10-24 NOTE — UTILIZATION REVIEW
WAS OBSERVATION 10/23/24 @ 0031 CONVERTED TO INPATIENT ADMISSION 10/24/24 @ 1427 DUE TO CONTINUED STAY REQUIRED TO CARE FOR PATIENT WITH Bilateral Pneumonia requiring IV abx and IV fluids.    Initial Clinical Review    Admission: Date/Time/Statement:   Admission Orders (From admission, onward)       Ordered        10/24/24 1427  INPATIENT ADMISSION  Once            10/23/24 0031  Place in Observation  Once                          Orders Placed This Encounter   Procedures    INPATIENT ADMISSION     Standing Status:   Standing     Number of Occurrences:   1     Order Specific Question:   Level of Care     Answer:   Med Surg [16]     Order Specific Question:   Estimated length of stay     Answer:   More than 2 Midnights     Order Specific Question:   Certification     Answer:   I certify that inpatient services are medically necessary for this patient for a duration of greater than two midnights. See H&P and MD Progress Notes for additional information about the patient's course of treatment.     ED Arrival Information       Expected   -    Arrival   10/22/2024 22:44    Acuity   Urgent              Means of arrival   Walk-In    Escorted by   Self    Service   Hospitalist    Admission type   Emergency              Arrival complaint   chest congested             Chief Complaint   Patient presents with    Cough     Pt diagnosed with pneumonia Wednesday, still has 3 more antibiotics to take, but c/o chills and productive cough that is not improving with antibiotics.        Initial Presentation: 29 y.o. female who presented self from home to [facility] ED. Admitted as Observation for evaluation and treatment of Bilateral Pneumonia.     PMHx:  has a past medical history of Abnormal Pap smear of cervix, HPV (human papilloma virus) infection, and Varicella.      Presented w/ persistent productive cough, fever and chills. Pt recently seen in ED 10/16 and diagnosed  w/ R sided pneumonia, discharged on Augmentin. No  improvement at home so she came to ED.  On exam, Mucous membranes dry. Coughing noted. Rhonchi present..PT tachycardic. Abnormal  Labs Bun/Creat 4/0.57, H/H 9.1/28.3. Mg 1.7. CT chest: Multi lobar pneumonia with the most dense consolidation in the right upper lobe posteriorly and left lower lobe laterally. Additional areas of pneumonia involving the lingula, right lower lobe and right middle lobe with more of a reticulonodular pattern. IN the ED, pt received IV Ceftriaxone and tylenol PO.     Plan: Continue IV Ceftriaxone and Azithromycin PO, Trend procal,  Follow respiratory panel 2, urine antigens,  Sputum culture, Gram stain, Scheduled nebs. Replete Mg IV.  CBC and CMP w/ Mg level in am.  Pulm consulted.    Pulm Consult: Pneumonia  On exam, pt has occasional crackles on R side and occasional rhonchi. She may have viral pneumonia. Plan: Continue abx for now. Recommend a CT scan of chest without contrast for further eval.     Date: 10/24/24   BED STATUS CHANGED TO INPATIENT  Pt reports having cough and SOB w/ exertion but better than yesterday. Pt remains tachycardic.  Abnormal labs: Procal 1.24, Creat 0.59. Mg 1.9. H/H 8.7/23.8.  Plan: Continue IV Ceftriaxone and PO Azithromycin, Add IV Vanco given worsening pneumonia on CT. Sputum culture pending. Continue IV fluids. Start Ferrous sulfate. Obtain ECHO. Replete Mg IV.  CBC and CMP w/ Mg level in am.     Certification Statement: The patient will continue to require additional inpatient hospital stay due to bilateral pneumonia, tachycardia     ED Treatment-Medication Administration from 10/22/2024 5224 to 10/23/2024 0052         Date/Time Order Dose Route Action     10/22/2024 2323 acetaminophen (TYLENOL) tablet 975 mg 975 mg Oral Given     10/23/2024 0045 cefTRIAXone (ROCEPHIN) IVPB (premix in dextrose) 1,000 mg 50 mL 1,000 mg Intravenous New Bag            Scheduled Medications:  azithromycin, 500 mg, Oral, Q24H  cefTRIAXone, 1,000 mg, Intravenous, Q24H  docusate  sodium, 100 mg, Oral, BID  enoxaparin, 40 mg, Subcutaneous, Daily  ferrous sulfate, 325 mg, Oral, Daily With Breakfast  guaiFENesin, 600 mg, Oral, Q12H ABNER  levalbuterol, 1.25 mg, Nebulization, TID  loratadine, 10 mg, Oral, Daily  polyethylene glycol, 17 g, Oral, Daily  vancomycin, 17.5 mg/kg, Intravenous, Q8H  magnesium sulfate 2 g/50 mL IVPB (premix) 2 g  Dose: 2 g  Freq: Once Route: IV  Last Dose: 2 g (10/23/24 0644)  Start: 10/23/24 0630 End: 10/23/24 0844  magnesium sulfate IVPB (premix) SOLN 1 g  Dose: 1 g  Freq: Once Route: IV  Start: 10/24/24 1130 End: 10/24/24 1347      Continuous IV Infusions:  sodium chloride, 75 mL/hr, Intravenous, Continuous      PRN Meds:  acetaminophen, 650 mg, Oral, Q6H PRN - given x1 10/23 @ 1702  benzonatate, 100 mg, Oral, TID PRN  ondansetron, 4 mg, Intravenous, Q6H PRN      ED Triage Vitals   Temperature Pulse Respirations Blood Pressure SpO2 Pain Score   10/22/24 2250 10/22/24 2250 10/22/24 2250 10/22/24 2250 10/22/24 2250 10/23/24 0100   100.5 °F (38.1 °C) (!) 128 18 122/72 93 % No Pain     Weight (last 2 days)       Date/Time Weight    10/23/24 0057 53.5 (118)            Vital Signs (last 3 days)       Date/Time Temp Pulse Resp BP MAP (mmHg) SpO2 O2 Device Patient Position - Orthostatic VS Pain    10/24/24 1100 -- -- -- -- -- -- -- -- No Pain    10/24/24 0937 -- -- -- -- -- 94 % -- -- --    10/24/24 0807 98.5 °F (36.9 °C) 112 16 122/74 -- 90 % None (Room air) Lying --    10/24/24 0000 98.7 °F (37.1 °C) 108 20 109/65 82 97 % None (Room air) Lying --    10/23/24 2001 -- -- -- -- -- 100 % None (Room air) -- No Pain    10/23/24 1702 -- -- -- -- -- -- -- -- 4    10/23/24 1500 99 °F (37.2 °C) 134 20 106/83 -- 97 % -- Lying --    10/23/24 1449 -- -- -- -- -- 96 % None (Room air) -- --    10/23/24 1100 -- -- -- -- -- -- -- -- No Pain    10/23/24 0912 -- -- -- -- -- 97 % None (Room air) -- --    10/23/24 0821 99.7 °F (37.6 °C) 114 20 109/67 -- 97 % None (Room air) Lying --    10/23/24  0555 -- -- -- -- -- -- -- -- No Pain    10/23/24 0135 -- 97 18 -- -- 96 % None (Room air) -- --    10/23/24 0100 -- -- -- -- -- -- -- -- No Pain    10/23/24 0057 98.5 °F (36.9 °C) 112 18 105/66 81 95 % None (Room air) Sitting --    10/22/24 2250 100.5 °F (38.1 °C) 128 18 122/72 -- 93 % None (Room air) -- --              Pertinent Labs/Diagnostic Test Results:   Radiology:  CT chest wo contrast   Final Interpretation by David Gloria MD (10/23 2101)      Multi lobar pneumonia with the most dense consolidation in the right upper lobe posteriorly and left lower lobe laterally. Additional areas of pneumonia involving the lingula, right lower lobe and right middle lobe with more of a reticulonodular pattern.      Bilateral nonobstructing intrarenal calculi.               Workstation performed: [id number]         XR chest 2 views   ED Interpretation by Henry Andrews MD (10/23 0010)   Right upper lobe infiltrate, more pronounced compared to prior chest radiograph from 10/16/2024.      Final Interpretation by Mariano Kwok MD (10/23 1102)      Interval progression of right upper lobe pneumonia, with additional developing left lower lobe consolidation.                  Resident: Carmelo Juarez I, the attending radiologist, have reviewed the images and agree with the final report above.      Workstation performed: [id number]             Results from last 7 days   Lab Units 10/23/24  0040   SARS-COV-2  Not Detected     Results from last 7 days   Lab Units 10/24/24  0443 10/23/24  0447 10/22/24  2329   WBC Thousand/uL 5.70 5.27 6.24   HEMOGLOBIN g/dL 8.7* 9.1* 9.6*   HEMATOCRIT % 23.8* 28.3* 30.5*   PLATELETS Thousands/uL 269 254 265   TOTAL NEUT ABS Thousands/µL  --   --  4.84         Results from last 7 days   Lab Units 10/24/24  0443 10/23/24  0447 10/22/24  2329   SODIUM mmol/L 140 140 138   POTASSIUM mmol/L 3.8 3.8 3.8   CHLORIDE mmol/L 104 104 101   CO2 mmol/L 26 26 28   ANION GAP mmol/L 10 10 9   BUN  mg/dL 4* 4* 5   CREATININE mg/dL 0.59* 0.57* 0.58*   EGFR ml/min/1.73sq m 123 124 124   CALCIUM mg/dL 8.6 8.4 9.2   MAGNESIUM mg/dL 1.9 1.7*  --      Results from last 7 days   Lab Units 10/22/24  2329   AST U/L 14   ALT U/L 10   ALK PHOS U/L 40   TOTAL PROTEIN g/dL 7.1   ALBUMIN g/dL 3.7   TOTAL BILIRUBIN mg/dL 0.73         Results from last 7 days   Lab Units 10/24/24  0443 10/23/24  0447 10/22/24  2329   GLUCOSE RANDOM mg/dL 89 91 94       Results from last 7 days   Lab Units 10/23/24  0447 10/23/24  0228 10/23/24  0040   HS TNI 0HR ng/L  --   --  <2   HS TNI 2HR ng/L  --  <2  --    HS TNI 4HR ng/L <2  --   --              Results from last 7 days   Lab Units 10/22/24  2329   TSH 3RD GENERATON uIU/mL 1.725     Results from last 7 days   Lab Units 10/24/24  0443 10/22/24  2329   PROCALCITONIN ng/ml 1.24* <0.05     Results from last 7 days   Lab Units 10/22/24  2329   LACTIC ACID mmol/L 0.7                 Results from last 7 days   Lab Units 10/22/24  2329   FERRITIN ng/mL 17   IRON SATURATION % 4*   IRON ug/dL 13*   TIBC ug/dL 319       Results from last 7 days   Lab Units 10/22/24  2329   CRP mg/L 51.4*   SED RATE mm/hour 54*     Results from last 7 days   Lab Units 10/23/24  0130 10/23/24  0040   STREP PNEUMONIAE ANTIGEN, URINE  Negative  --    LEGIONELLA URINARY ANTIGEN  Negative  --    INFLUENZA B   --  Not Detected   RESPIRATORY SYNCYTIAL VIRUS   --  Not Detected     Results from last 7 days   Lab Units 10/23/24  0040   ADENOVIRUS  Not Detected   BORDETELLA PARAPERTUSSIS  Not Detected   BORDETELLA PERTUSSIS  Not Detected   CHLAMYDIA PNEUMONIAE  Not Detected   CORONAVIRUS 229E  Not Detected   CORONAVIRUS HKU1  Not Detected   CORONAVIRUS NL63  Not Detected   CORONAVIRUS OC43  Not Detected   METAPNEUMOVIRUS  Not Detected   RHINOVIRUS  Not Detected   MYCOPLASMA PNEUMONIAE  Not Detected   PARAINFLUENZA 1  Not Detected   PARAINFLUENZA 2  Not Detected   PARAINFLUENZA 3  Not Detected   PARAINFLUENZA 4  Not Detected            Past Medical History:   Diagnosis Date    Abnormal Pap smear of cervix     HPV (human papilloma virus) infection     Varicella     vacc x?     Present on Admission:   Bilateral pneumonia      Admitting Diagnosis: Chest congestion [R09.89]  Right upper lobe pneumonia [J18.9]  Pneumonia involving right lung [J18.9]  Age/Sex: 29 y.o. female    Network Utilization Review Department  ATTENTION: Please call with any questions or concerns to [phone] and carefully listen to the prompts so that you are directed to the right person. All voicemails are confidential.   For Discharge needs, contact Care Management DC Support Team at [phone] opt. [phone]  Send all requests for admission clinical reviews, approved or denied determinations and any other requests to dedicated fax number below belonging to the campus where the patient is receiving treatment. List of dedicated fax numbers for the Facilities:  FACILITY NAME UR FAX NUMBER   ADMISSION DENIALS (Administrative/Medical Necessity) [phone]   DISCHARGE SUPPORT TEAM (NETWORK) [phone]   PARENT CHILD HEALTH (Maternity/NICU/Pediatrics) [phone]   [facility] [phone]   [facility] [phone]   [facility] [phone]   [facility] [phone]   [facility] [phone]   [facility] [phone]   [facility] [phone]   [facility] [phone]   [facility] [phone]   [facility] [phone]   [facility] [phone]   [facility] [phone]

## 2024-10-24 NOTE — ASSESSMENT & PLAN NOTE
ECG: Sinus tachycardia () without acute ischemic changes  Denies any chest pain  Negative troponin x3  Likely in the setting of acute infection  Obtain echo  Optimize electrolytes  Continue IVF for hydration  Monitor on telemetry

## 2024-10-24 NOTE — PLAN OF CARE
Problem: INFECTION - ADULT  Goal: Absence or prevention of progression during hospitalization  Description: INTERVENTIONS:  - Assess and monitor for signs and symptoms of infection  - Monitor lab/diagnostic results  - Monitor all insertion sites, i.e. indwelling lines, tubes, and drains  - Monitor endotracheal if appropriate and nasal secretions for changes in amount and color  - [place] appropriate cooling/warming therapies per order  - Administer medications as ordered  - Instruct and encourage patient and family to use good hand hygiene technique  - Identify and instruct in appropriate isolation precautions for identified infection/condition  Outcome: Progressing  Goal: Absence of fever/infection during neutropenic period  Description: INTERVENTIONS:  - Monitor WBC    Outcome: Progressing     Problem: SAFETY ADULT  Goal: Patient will remain free of falls  Description: INTERVENTIONS:  - Educate patient/family on patient safety including physical limitations  - Instruct patient to call for assistance with activity   - Consult OT/PT to assist with strengthening/mobility   - Keep Call bell within reach  - Keep bed low and locked with side rails adjusted as appropriate  - Keep care items and personal belongings within reach  - Initiate and maintain comfort rounds  - Make Fall Risk Sign visible to staff  - Offer Toileting every 2 Hours, in advance of need  - Initiate/Maintain no alarm, pt not a fall risk   - Obtain necessary fall risk management equipment: yellow socks  - Apply yellow socks and bracelet for high fall risk patients  - Consider moving patient to room near nurses station  Outcome: Progressing  Goal: Maintain or return to baseline ADL function  Description: INTERVENTIONS:  -  Assess patient's ability to carry out ADLs; assess patient's baseline for ADL function and identify physical deficits which impact ability to perform ADLs (bathing, care of mouth/teeth, toileting, grooming, dressing, etc.)  -  Assess/evaluate cause of self-care deficits   - Assess range of motion  - Assess patient's mobility; develop plan if impaired  - Assess patient's need for assistive devices and provide as appropriate  - Encourage maximum independence but intervene and supervise when necessary  - Involve family in performance of ADLs  - Assess for home care needs following discharge   - Consider OT consult to assist with ADL evaluation and planning for discharge  - Provide patient education as appropriate  Outcome: Progressing     Problem: DISCHARGE PLANNING  Goal: Discharge to home or other facility with appropriate resources  Description: INTERVENTIONS:  - Identify barriers to discharge w/patient and caregiver  - Arrange for needed discharge resources and transportation as appropriate  - Identify discharge learning needs (meds, wound care, etc.)  - Arrange for interpretive services to assist at discharge as needed  - Refer to Case Management Department for coordinating discharge planning if the patient needs post-hospital services based on physician/advanced practitioner order or complex needs related to functional status, cognitive ability, or social support system  Outcome: Progressing     Problem: Knowledge Deficit  Goal: Patient/family/caregiver demonstrates understanding of disease process, treatment plan, medications, and discharge instructions  Description: Complete learning assessment and assess knowledge base.  Interventions:  - Provide teaching at level of understanding  - Provide teaching via preferred learning methods  Outcome: Progressing

## 2024-10-24 NOTE — ASSESSMENT & PLAN NOTE
Presented with persistent productive cough and fever. Recently seen in emergency department, 10/16 was diagnosed with right-sided pneumonia and discharged on Augmentin. No improvement, despite being compliant with treatment  Did not meet SIRS or sepsis criteria  Noted to have temperature of 100.5 in ED  D dimer negative   COVID/FLU/RSV and RP2 negative  CRP and ESR elevated but denies any pleuritic chest pain   CT chest (10/23): Multi lobar pneumonia with the most dense consolidation in the right upper lobe posteriorly and left lower lobe laterally. Additional areas of pneumonia involving the lingula, right lower lobe and right middle lobe with more of a reticulonodular pattern.   Urine antigens negative  Procalcitonin: 0.05-> 1.24  Sputum culture pending follow results  Although antibiotics already given, will check BC x2 to evaluate for bacteremia  Continue IV ceftriaxone and azithromycin (D3). Add on vancomycin given worsening pneumonia on CT. Discussed with ID, agree with current regimen. Will follow peripherally at this time.   Supportive care with tylenol, decongestant, cough suppressant  Currently oxygenating well on RA  Pulmonology consulted, recommendations are appreciated

## 2024-10-24 NOTE — PROGRESS NOTES
Progress Note - Hospitalist   Name: Prerna Garcia 29 y.o. female I MRN: [id number]  Unit/Bed#: -01 I Date of Admission: 10/22/2024   Date of Service: 10/24/2024 I Hospital Day: 0    Assessment & Plan  Bilateral pneumonia  Presented with persistent productive cough and fever. Recently seen in emergency department, 10/16 was diagnosed with right-sided pneumonia and discharged on Augmentin. No improvement, despite being compliant with treatment  Did not meet SIRS or sepsis criteria  Noted to have temperature of 100.5 in ED  D dimer negative   COVID/FLU/RSV and RP2 negative  CRP and ESR elevated but denies any pleuritic chest pain   CT chest (10/23): Multi lobar pneumonia with the most dense consolidation in the right upper lobe posteriorly and left lower lobe laterally. Additional areas of pneumonia involving the lingula, right lower lobe and right middle lobe with more of a reticulonodular pattern.   Urine antigens negative  Procalcitonin: 0.05-> 1.24  Sputum culture pending follow results  Although antibiotics already given, will check BC x2 to evaluate for bacteremia  Continue IV ceftriaxone and azithromycin (D3). Add on vancomycin given worsening pneumonia on CT. Discussed with ID, agree with current regimen. Will follow peripherally at this time.   Supportive care with tylenol, decongestant, cough suppressant  Currently oxygenating well on RA  Pulmonology consulted, recommendations are appreciated    Anemia  Per chart review patient with history noted  Denies any active bleeding  B12 and folate WNL  Iron panel suggestive of iron deficiency. Start on ferrous sulfate 325mg QD  Monitor daily CBC  Hypomagnesemia  Magnesium stabilized 1.9 today  S/p supplementation  Monitor   Tachycardia  ECG: Sinus tachycardia () without acute ischemic changes  Denies any chest pain  Negative troponin x3  Likely in the setting of acute infection  Obtain echo  Optimize electrolytes  Continue IVF for hydration  Monitor on  telemetry    VTE Pharmacologic Prophylaxis:   Moderate Risk (Score 3-4) - Pharmacological DVT Prophylaxis Ordered: enoxaparin (Lovenox).    Mobility:   Basic Mobility Inpatient Raw Score: 24  JH-HLM Goal: 8: Walk 250 feet or more  JH-HLM Achieved: 7: Walk 25 feet or more  JH-HLM Goal achieved. Continue to encourage appropriate mobility.    Patient Centered Rounds: I performed bedside rounds with nursing staff today.   Discussions with Specialists or Other Care Team Provider: Nursing, Pulmonology, ID    Education and Discussions with Family / Patient: Updated  () via phone.    Current Length of Stay: 0 day(s)  Current Patient Status: Observation   Certification Statement: The patient will continue to require additional inpatient hospital stay due to bilateral pneumonia, tachycardia  Discharge Plan: Anticipate discharge in 24-48 hrs to home.    Code Status: Level 1 - Full Code    Subjective   Patient lying in bed. Reports having cough and SOB with exertion but does feel a little better than yesterday. Denies any fevers/chills, chest pain, abdominal pain.     Objective :  Temp:  [98.5 °F (36.9 °C)-99 °F (37.2 °C)] 98.5 °F (36.9 °C)  HR:  [108-134] 112  BP: (106-122)/(65-83) 122/74  Resp:  [16-20] 16  SpO2:  [90 %-100 %] 94 %  O2 Device: None (Room air)    Body mass index is 20.25 kg/m².     Input and Output Summary (last 24 hours):     Intake/Output Summary (Last 24 hours) at 10/24/2024 1123  Last data filed at 10/24/2024 0521  Gross per 24 hour   Intake 1300 ml   Output --   Net 1300 ml       Physical Exam  Vitals and nursing note reviewed.   Constitutional:       General: She is not in acute distress.     Appearance: She is well-developed.   HENT:      Head: Normocephalic and atraumatic.   Eyes:      Conjunctiva/sclera: Conjunctivae normal.   Cardiovascular:      Rate and Rhythm: Regular rhythm. Tachycardia present.      Heart sounds: No murmur heard.  Pulmonary:      Effort: Pulmonary effort is  normal. No respiratory distress.      Breath sounds: Rales present.   Abdominal:      Palpations: Abdomen is soft.      Tenderness: There is no abdominal tenderness.   Musculoskeletal:         General: No swelling.      Cervical back: Neck supple.   Skin:     General: Skin is warm and dry.      Capillary Refill: Capillary refill takes less than 2 seconds.   Neurological:      Mental Status: She is alert. Mental status is at baseline.   Psychiatric:         Mood and Affect: Mood normal.           Lines/Drains:              Lab Results: I have reviewed the following results:   Results from last 7 days   Lab Units 10/24/24  0443 10/23/24  0447 10/22/24  2329   WBC Thousand/uL 5.70   < > 6.24   HEMOGLOBIN g/dL 8.7*   < > 9.6*   HEMATOCRIT % 23.8*   < > 30.5*   PLATELETS Thousands/uL 269   < > 265   SEGS PCT %  --   --  78*   LYMPHO PCT %  --   --  10*   MONO PCT %  --   --  7   EOS PCT %  --   --  5    < > = values in this interval not displayed.     Results from last 7 days   Lab Units 10/24/24  0443 10/23/24  0447 10/22/24  2329   SODIUM mmol/L 140   < > 138   POTASSIUM mmol/L 3.8   < > 3.8   CHLORIDE mmol/L 104   < > 101   CO2 mmol/L 26   < > 28   BUN mg/dL 4*   < > 5   CREATININE mg/dL 0.59*   < > 0.58*   ANION GAP mmol/L 10   < > 9   CALCIUM mg/dL 8.6   < > 9.2   ALBUMIN g/dL  --   --  3.7   TOTAL BILIRUBIN mg/dL  --   --  0.73   ALK PHOS U/L  --   --  40   ALT U/L  --   --  10   AST U/L  --   --  14   GLUCOSE RANDOM mg/dL 89   < > 94    < > = values in this interval not displayed.                 Results from last 7 days   Lab Units 10/24/24  0443 10/22/24  2329   LACTIC ACID mmol/L  --  0.7   PROCALCITONIN ng/ml 1.24* <0.05       Recent Cultures (last 7 days):   Results from last 7 days   Lab Units 10/23/24  0130   LEGIONELLA URINARY ANTIGEN  Negative       Imaging Results Review: I reviewed radiology reports from this admission including: CT chest.  Other Study Results Review: No additional pertinent studies  reviewed.    Last 24 Hours Medication List:     Current Facility-Administered Medications:     acetaminophen (TYLENOL) tablet 650 mg, Q6H PRN    azithromycin (ZITHROMAX) tablet 500 mg, Q24H    benzonatate (TESSALON PERLES) capsule 100 mg, TID PRN    cefTRIAXone (ROCEPHIN) IVPB (premix in dextrose) 1,000 mg 50 mL, Q24H, Last Rate: 100 mL/hr at 10/24/24 0521    docusate sodium (COLACE) capsule 100 mg, BID    enoxaparin (LOVENOX) subcutaneous injection 40 mg, Daily    ferrous sulfate tablet 325 mg, Daily With Breakfast    guaiFENesin (MUCINEX) 12 hr tablet 600 mg, Q12H ABNER    Hydrocod Addy-Chlorphe Addy ER (TUSSIONEX) ER suspension 5 mL, Q12H    levalbuterol (XOPENEX) inhalation solution 1.25 mg, TID    loratadine (CLARITIN) tablet 10 mg, Daily    magnesium sulfate IVPB (premix) SOLN 1 g, Once    ondansetron (ZOFRAN) injection 4 mg, Q6H PRN    polyethylene glycol (MIRALAX) packet 17 g, Daily    sodium chloride 0.9 % infusion, Continuous, Last Rate: 75 mL/hr (10/24/24 0910)    vancomycin (VANCOCIN) IVPB (premix in dextrose) 1,000 mg 200 mL, Q8H    Administrative Statements   Today, Patient Was Seen By: Kenia Altamirano PA-C  I have spent a total time of 30 minutes in caring for this patient on the day of the visit/encounter including Diagnostic results, Instructions for management, Patient and family education, Importance of tx compliance, Counseling / Coordination of care, Documenting in the medical record, Reviewing / ordering tests, medicine, procedures  , Obtaining or reviewing history  , and Communicating with other healthcare professionals .    **Please Note: This note may have been constructed using a voice recognition system.**

## 2024-10-24 NOTE — PROGRESS NOTES
Prerna Garcia is a 29 y.o. female who is currently ordered Vancomycin IV with management by the Pharmacy Consult service.  Relevant clinical data and objective / subjective history reviewed.  Vancomycin Assessment:  Indication and Goal AUC/Trough: Pneumonia (goal -600, trough >10), -600, trough >10  Clinical Status:  new start  Micro:     Renal Function:  SCr: 0.70 mg/dL  CrCl: 99.9 mL/min  Renal replacement: Not on dialysis  Days of Therapy: 1  Current Dose: Loading Dose: 1250mg IV once  Vancomycin Plan:  New Dosinmg IV q8hrs  Estimated AUC: 556 mcg*hr/mL  Estimated Trough: 13.8 mcg/mL  Next Level: 10/25 AM labs  Renal Function Monitoring: Daily BMP and UOP  Pharmacy will continue to follow closely for s/sx of nephrotoxicity, infusion reactions and appropriateness of therapy.  BMP and CBC will be ordered per protocol. We will continue to follow the patient’s culture results and clinical progress daily.    Bozena Yap, Pharmacist

## 2024-10-24 NOTE — ASSESSMENT & PLAN NOTE
Per chart review patient with history noted  Denies any active bleeding  B12 and folate WNL  Iron panel suggestive of iron deficiency. Start on ferrous sulfate 325mg QD  Monitor daily CBC

## 2024-10-24 NOTE — PLAN OF CARE
Problem: PAIN - ADULT  Goal: Verbalizes/displays adequate comfort level or baseline comfort level  Description: Interventions:  - Encourage patient to monitor pain and request assistance  - Assess pain using appropriate pain scale  - Administer analgesics based on type and severity of pain and evaluate response  - Implement non-pharmacological measures as appropriate and evaluate response  - Consider cultural and social influences on pain and pain management  - Notify physician/advanced practitioner if interventions unsuccessful or patient reports new pain  Outcome: Progressing     Problem: SAFETY ADULT  Goal: Maintain or return to baseline ADL function  Description: INTERVENTIONS:  -  Assess patient's ability to carry out ADLs; assess patient's baseline for ADL function and identify physical deficits which impact ability to perform ADLs (bathing, care of mouth/teeth, toileting, grooming, dressing, etc.)  - Assess/evaluate cause of self-care deficits   - Assess range of motion  - Assess patient's mobility; develop plan if impaired  - Assess patient's need for assistive devices and provide as appropriate  - Encourage maximum independence but intervene and supervise when necessary  - Involve family in performance of ADLs  - Assess for home care needs following discharge   - Consider OT consult to assist with ADL evaluation and planning for discharge  - Provide patient education as appropriate  Outcome: Progressing     Problem: INFECTION - ADULT  Goal: Absence or prevention of progression during hospitalization  Description: INTERVENTIONS:  - Assess and monitor for signs and symptoms of infection  - Monitor lab/diagnostic results  - Monitor all insertion sites, i.e. indwelling lines, tubes, and drains  - Monitor endotracheal if appropriate and nasal secretions for changes in amount and color  - [place] appropriate cooling/warming therapies per order  - Administer medications as ordered  - Instruct and encourage  patient and family to use good hand hygiene technique  - Identify and instruct in appropriate isolation precautions for identified infection/condition  Outcome: Progressing

## 2024-10-24 NOTE — ASSESSMENT & PLAN NOTE
Her hemoglobin was found to be 8.7..  The etiology of this iron deficiency anemia is not clear at this time.

## 2024-10-24 NOTE — PROGRESS NOTES
Progress Note - Pulmonology   Name: Prerna Garcia 29 y.o. female I MRN: [id number]  Unit/Bed#: -01 I Date of Admission: 10/22/2024   Date of Service: 10/24/2024 I Hospital Day: 0     Assessment & Plan  Bilateral pneumonia  Her chest imaging showed features of right lung predominantly right upper lobe.  She has no leukocytosis and her procalcitonin has been low.  Her chest auscultation revealed occasional crackles on the right side and occasional rhonchi.  She was empirically started on ceftriaxone and azithromycin.  Her CT scan however showed multilobar pneumonia.  She most probably has viral pneumonia.  Continue antibiotics for now.  The blood cultures are negative so far.  The respiratory viral panel was negative.  ID service has been consulted.  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  Currently she is not needing any oxygen supplementation.   Anemia  Her hemoglobin was found to be 8.7..  The etiology of this iron deficiency anemia is not clear at this time.     Spoke to the patient.  Reassured.  Answered all questions.    Discussed with primary team.    Thank you for allowing me to participate in the care of the patient.    HISTORY:  She is feeling better.  She still has cough with phlegm.  No wheezing.  No chest pain no palpitation.  No fever or chills.    PHYSICAL EXAMINATION:  On clinical examination, she was comfortable on room air at rest.  Hemodynamically stable and afebrile.  Heart S1-S2 heard.  Chest air entry present bilaterally crackles bilaterally no rhonchi.  HEENT: No conjunctival pallor no cyanosis.  Neck: No jugular venous distention no significant neck or supraclavicular adenopathy.  Abdomen soft and nontender bowel sounds audible.  Neuro awake alert oriented.  Extremities no clubbing no edema.    LABORATORY INVESTIGATIONS:  Sodium 140 potassium 3.8 bicarbonate 26 creatinine 0.59 white cell count 5.7 hemoglobin 8.7 platelet 269.  CT scan films reviewed.  She has  multilobar pneumonia.

## 2024-10-24 NOTE — ASSESSMENT & PLAN NOTE
Her chest imaging showed features of right lung predominantly right upper lobe.  She has no leukocytosis and her procalcitonin has been low.  Her chest auscultation revealed occasional crackles on the right side and occasional rhonchi.  She was empirically started on ceftriaxone and azithromycin.  Her CT scan however showed multilobar pneumonia.  She most probably has viral pneumonia.  Continue antibiotics for now.  The blood cultures are negative so far.  The respiratory viral panel was negative.  ID service has been consulted.  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  Currently she is not needing any oxygen supplementation.

## 2024-10-25 LAB
ANION GAP SERPL CALCULATED.3IONS-SCNC: 8 MMOL/L (ref 4–13)
ATRIAL RATE: 102 BPM
BUN SERPL-MCNC: 2 MG/DL (ref 5–25)
CALCIUM SERPL-MCNC: 8.9 MG/DL (ref 8.4–10.2)
CHLORIDE SERPL-SCNC: 103 MMOL/L (ref 96–108)
CO2 SERPL-SCNC: 27 MMOL/L (ref 21–32)
CREAT SERPL-MCNC: 0.54 MG/DL (ref 0.6–1.3)
ERYTHROCYTE [DISTWIDTH] IN BLOOD BY AUTOMATED COUNT: 15.1 % (ref 11.6–15.1)
GFR SERPL CREATININE-BSD FRML MDRD: 127 ML/MIN/1.73SQ M
GLUCOSE SERPL-MCNC: 92 MG/DL (ref 65–140)
HCT VFR BLD AUTO: 26.5 % (ref 34.8–46.1)
HGB BLD-MCNC: 8.9 G/DL (ref 11.5–15.4)
MAGNESIUM SERPL-MCNC: 1.9 MG/DL (ref 1.9–2.7)
MCH RBC QN AUTO: 29.7 PG (ref 26.8–34.3)
MCHC RBC AUTO-ENTMCNC: 33.6 G/DL (ref 31.4–37.4)
MCV RBC AUTO: 88 FL (ref 82–98)
MRSA NOSE QL CULT: NORMAL
P AXIS: 64 DEGREES
PLATELET # BLD AUTO: 298 THOUSANDS/UL (ref 149–390)
PMV BLD AUTO: 9.6 FL (ref 8.9–12.7)
POTASSIUM SERPL-SCNC: 3.9 MMOL/L (ref 3.5–5.3)
PR INTERVAL: 180 MS
PROCALCITONIN SERPL-MCNC: 0.39 NG/ML
QRS AXIS: 69 DEGREES
QRSD INTERVAL: 84 MS
QT INTERVAL: 332 MS
QTC INTERVAL: 432 MS
RBC # BLD AUTO: 3 MILLION/UL (ref 3.81–5.12)
SODIUM SERPL-SCNC: 138 MMOL/L (ref 135–147)
T WAVE AXIS: 47 DEGREES
VANCOMYCIN SERPL-MCNC: 19.2 UG/ML (ref 10–20)
VENTRICULAR RATE: 102 BPM
WBC # BLD AUTO: 4.75 THOUSAND/UL (ref 4.31–10.16)

## 2024-10-25 PROCEDURE — 94760 N-INVAS EAR/PLS OXIMETRY 1: CPT

## 2024-10-25 PROCEDURE — 94640 AIRWAY INHALATION TREATMENT: CPT

## 2024-10-25 PROCEDURE — 99232 SBSQ HOSP IP/OBS MODERATE 35: CPT | Performed by: INTERNAL MEDICINE

## 2024-10-25 PROCEDURE — 80202 ASSAY OF VANCOMYCIN: CPT | Performed by: INTERNAL MEDICINE

## 2024-10-25 PROCEDURE — 94664 DEMO&/EVAL PT USE INHALER: CPT

## 2024-10-25 PROCEDURE — 83735 ASSAY OF MAGNESIUM: CPT | Performed by: PHYSICIAN ASSISTANT

## 2024-10-25 PROCEDURE — 84145 PROCALCITONIN (PCT): CPT | Performed by: INTERNAL MEDICINE

## 2024-10-25 PROCEDURE — 80048 BASIC METABOLIC PNL TOTAL CA: CPT | Performed by: INTERNAL MEDICINE

## 2024-10-25 PROCEDURE — 93010 ELECTROCARDIOGRAM REPORT: CPT | Performed by: INTERNAL MEDICINE

## 2024-10-25 PROCEDURE — 99254 IP/OBS CNSLTJ NEW/EST MOD 60: CPT | Performed by: INTERNAL MEDICINE

## 2024-10-25 PROCEDURE — 99232 SBSQ HOSP IP/OBS MODERATE 35: CPT | Performed by: PHYSICIAN ASSISTANT

## 2024-10-25 PROCEDURE — 85027 COMPLETE CBC AUTOMATED: CPT | Performed by: INTERNAL MEDICINE

## 2024-10-25 RX ORDER — MAGNESIUM SULFATE 1 G/100ML
1 INJECTION INTRAVENOUS ONCE
Status: COMPLETED | OUTPATIENT
Start: 2024-10-25 | End: 2024-10-26

## 2024-10-25 RX ORDER — METOPROLOL TARTRATE 1 MG/ML
5 INJECTION, SOLUTION INTRAVENOUS ONCE
Status: COMPLETED | OUTPATIENT
Start: 2024-10-25 | End: 2024-10-25

## 2024-10-25 RX ORDER — POTASSIUM CHLORIDE 1500 MG/1
20 TABLET, EXTENDED RELEASE ORAL ONCE
Status: COMPLETED | OUTPATIENT
Start: 2024-10-25 | End: 2024-10-25

## 2024-10-25 RX ADMIN — VANCOMYCIN HYDROCHLORIDE 1000 MG: 1 INJECTION, SOLUTION INTRAVENOUS at 01:08

## 2024-10-25 RX ADMIN — LORATADINE 10 MG: 10 TABLET ORAL at 08:55

## 2024-10-25 RX ADMIN — LEVALBUTEROL HYDROCHLORIDE 1.25 MG: 1.25 SOLUTION RESPIRATORY (INHALATION) at 19:33

## 2024-10-25 RX ADMIN — SODIUM CHLORIDE 75 ML/HR: 0.9 INJECTION, SOLUTION INTRAVENOUS at 09:06

## 2024-10-25 RX ADMIN — FERROUS SULFATE TAB 325 MG (65 MG ELEMENTAL FE) 325 MG: 325 (65 FE) TAB at 08:54

## 2024-10-25 RX ADMIN — POLYETHYLENE GLYCOL 3350 17 G: 17 POWDER, FOR SOLUTION ORAL at 08:53

## 2024-10-25 RX ADMIN — GUAIFENESIN AND DEXTROMETHORPHAN 10 ML: 100; 10 SYRUP ORAL at 15:05

## 2024-10-25 RX ADMIN — VANCOMYCIN HYDROCHLORIDE 1000 MG: 1 INJECTION, SOLUTION INTRAVENOUS at 08:53

## 2024-10-25 RX ADMIN — DOCUSATE SODIUM 100 MG: 100 CAPSULE, LIQUID FILLED ORAL at 08:56

## 2024-10-25 RX ADMIN — GUAIFENESIN AND DEXTROMETHORPHAN 10 ML: 100; 10 SYRUP ORAL at 04:00

## 2024-10-25 RX ADMIN — GUAIFENESIN 600 MG: 600 TABLET ORAL at 21:49

## 2024-10-25 RX ADMIN — VANCOMYCIN HYDROCHLORIDE 1000 MG: 1 INJECTION, SOLUTION INTRAVENOUS at 17:01

## 2024-10-25 RX ADMIN — MAGNESIUM SULFATE HEPTAHYDRATE 1 G: 1 INJECTION, SOLUTION INTRAVENOUS at 11:58

## 2024-10-25 RX ADMIN — LEVALBUTEROL HYDROCHLORIDE 1.25 MG: 1.25 SOLUTION RESPIRATORY (INHALATION) at 14:28

## 2024-10-25 RX ADMIN — AZITHROMYCIN 500 MG: 500 TABLET, FILM COATED ORAL at 05:10

## 2024-10-25 RX ADMIN — GUAIFENESIN AND DEXTROMETHORPHAN 10 ML: 100; 10 SYRUP ORAL at 21:55

## 2024-10-25 RX ADMIN — BENZONATATE 100 MG: 100 CAPSULE ORAL at 17:00

## 2024-10-25 RX ADMIN — POTASSIUM CHLORIDE 20 MEQ: 1500 TABLET, EXTENDED RELEASE ORAL at 09:16

## 2024-10-25 RX ADMIN — GUAIFENESIN 600 MG: 600 TABLET ORAL at 08:56

## 2024-10-25 RX ADMIN — LEVALBUTEROL HYDROCHLORIDE 1.25 MG: 1.25 SOLUTION RESPIRATORY (INHALATION) at 07:47

## 2024-10-25 RX ADMIN — ENOXAPARIN SODIUM 40 MG: 40 INJECTION SUBCUTANEOUS at 08:54

## 2024-10-25 RX ADMIN — METOPROLOL TARTRATE 5 MG: 5 INJECTION INTRAVENOUS at 08:53

## 2024-10-25 RX ADMIN — BENZONATATE 100 MG: 100 CAPSULE ORAL at 11:57

## 2024-10-25 RX ADMIN — DOCUSATE SODIUM 100 MG: 100 CAPSULE, LIQUID FILLED ORAL at 17:01

## 2024-10-25 NOTE — PLAN OF CARE
Problem: PAIN - ADULT  Goal: Verbalizes/displays adequate comfort level or baseline comfort level  Description: Interventions:  - Encourage patient to monitor pain and request assistance  - Assess pain using appropriate pain scale  - Administer analgesics based on type and severity of pain and evaluate response  - Implement non-pharmacological measures as appropriate and evaluate response  - Consider cultural and social influences on pain and pain management  - Notify physician/advanced practitioner if interventions unsuccessful or patient reports new pain  Outcome: Progressing     Problem: INFECTION - ADULT  Goal: Absence or prevention of progression during hospitalization  Description: INTERVENTIONS:  - Assess and monitor for signs and symptoms of infection  - Monitor lab/diagnostic results  - Monitor all insertion sites, i.e. indwelling lines, tubes, and drains  - Monitor endotracheal if appropriate and nasal secretions for changes in amount and color  - [place] appropriate cooling/warming therapies per order  - Administer medications as ordered  - Instruct and encourage patient and family to use good hand hygiene technique  - Identify and instruct in appropriate isolation precautions for identified infection/condition  Outcome: Progressing

## 2024-10-25 NOTE — ASSESSMENT & PLAN NOTE
History of anemia noted per chart review, prior Hgb as low at 8.8 in 2019  Vitamin B12, folate wnl. Iron panel suggestive of iron deficiency with low iron, borderline low/nl ferritin.  Started daily iron supplements, avoid IV Venofer while on antibiotics for pneumonia as above  Hgb remains stable around 8-9, no s/sx active bleeding  Monitor CBC while inpatient

## 2024-10-25 NOTE — ASSESSMENT & PLAN NOTE
Magnesium 1.7 on admission, since repleted  Repleting with IV mag sulfate, Mag 1.9 today  Monitor BMP/Mag, replete electrolytes PRN

## 2024-10-25 NOTE — ASSESSMENT & PLAN NOTE
Patient presented with persistent productive cough and fever.  Recently seen in ED on 10/16, diagnosed with right-sided pneumonia and discharged on Augmentin.  Did not meet SIRS criteria on admission, only noted to have temp 100.5 on arrival to ED.  CT chest showed multilobar pneumonia, most dense consolidation in RUL and LLL  D-dimer (10/16) negative. COVID/flu/strep PCR negative.  RP2 negative. Legionella/strep pneumo antigens negative.  Procal: 1.24->0.39. BC x2, MRSA & sputum cx: Pending.  Pulmonology following, recommendations appreciated  Completed 3 days azithromycin, last dose today.  Previous provider discussed with LUIS jordan, continue IV Rocephin + IV vancomycin pending cx results

## 2024-10-25 NOTE — PROGRESS NOTES
Prerna Garcia is a 29 y.o. female who is currently ordered Vancomycin IV with management by the Pharmacy Consult service.  Relevant clinical data and objective / subjective history reviewed.  Vancomycin Assessment:  Indication and Goal AUC/Trough: Pneumonia (goal -600, trough >10), -600, trough >10  Clinical Status: stable  Micro:     Renal Function:  SCr: 0.64 mg/dL  CrCl: 108.2 mL/min  Renal replacement: Not on dialysis  Days of Therapy: 2  Current Dose: 1000 mg IV q8h  Vancomycin Plan:  Continue Current Dosin mg IV q8h  Estimated AUC: 524 mcg*hr/mL  Estimated Trough: 12.5 mcg/mL  Next Level: 24 with AM labs  Renal Function Monitoring: Daily BMP and UOP  Pharmacy will continue to follow closely for s/sx of nephrotoxicity, infusion reactions and appropriateness of therapy.  BMP and CBC will be ordered per protocol. We will continue to follow the patient’s culture results and clinical progress daily.    Mattie Ramirez, Pharmacist

## 2024-10-25 NOTE — CONSULTS
[facility]                                              Cardiology Consult  Prerna Garcia 29 y.o. female   YOB: 1995 MRN: [id number]  Unit/Bed#: MS 340Isra Encounter: [id number]      Physician Requesting Consult: Richie Hill DO  Reason for Consult / Principal Problem: Tachycardia    Assessments  Principal Problem:    Bilateral pneumonia  Active Problems:    Anemia    Hypomagnesemia    Tachycardia      Plan  No complaints of palpitations or any other symptoms with sinus tachycardia  She has had mild sinus tachycardia during recent ED visit as well and almost persistent surgery cardiac through this admission as well  Echocardiogram done and reviewed, LVEF 70%, no significant valvular abnormalities  This is likely secondary to her anemia from recent menstrual bleed and ongoing infection/pneumonia  With absence of any significant tachyarrhythmia, any clear symptoms associated with sinus tachycardia and presence of clear triggering factors, treatment of etiologies recommended and already ongoing  Will defer treatment of anemia and pneumonia to primary medical service  No current indication for beta-blocker therapy at present  If patient were to become symptomatic or continue to have persistent sinus tachycardia after further resolution of infection and anemia, then could consider low-dose therapy at a later point  Counseled patient extensively regarding the nature of the tachycardia and its management  No further inpatient cardiac intervention necessary    Discussed with primary medical service Silvana Abarca PA-C  We will sign off at this time.  Please call with questions or reconsult as needed.    ECG: Personally reviewed. Sinus tachycardia  Telemetry: Personally reviewed. NSR / sinus tachycardia.  Heart rate up to 130s to 160s earlier today with activity, but now back down to 100-1 20 range.  No significant arrhythmia    History of Present Illness   HPI: Prerna Garcia is a 29 y.o.  year old female who presents with complains of recurrence of fever on Tuesday night and concerns regarding pneumonia .    She had recently presented to the ED on 10/16/2024 with complaints of cough, yellow-green sputum, mild shortness of breath which had been ongoing for a few days.  She had mild chest discomfort with the excessive coughing at that time, nonexertional.  She was evaluated in the ED and diagnosed with pneumonia and discharged on antibiotics.  She continued on medications at home but with recurrence of fever on Tuesday she got concerned about antibiotic failure and as result presented back to the ED.  She had a CT chest done this time and this confirmed multilobar pneumonia.  She has significant elevation in ESR, CRP, procalcitonin and as a result was admitted for further management with      Past Medical History:   Diagnosis Date    Abnormal Pap smear of cervix     HPV (human papilloma virus) infection     Varicella     vacc x?     Past Surgical History:   Procedure Laterality Date     SECTION      [place]IV[place] N/A 2016    Procedure:  SECTION ();  Surgeon: Mervin Brown MD;  Location: [facility] ;  Service: Obstetrics    [place] N/A 2019    Procedure:  SECTION () REPEAT;  Surgeon: Mervin Brown MD;  Location: [facility] ;  Service: Obstetrics    WISDOM TOOTH EXTRACTION       Family History   Problem Relation Age of Onset    Asthma Brother     Other Brother         SMOKING    Cancer Maternal Grandfather         lung    Other Maternal Grandfather         smoker    Migraines Mother     Other Mother         SMOKING    Other Father         SMOKING    Heart disease Father         MI    Hyperlipidemia Father     Cancer Paternal Aunt         brst    Varicose Veins Maternal Grandmother     Hypertension Maternal Grandmother     Heart disease Son         murmur     Meds/Allergies   all current active meds have been reviewed and current meds:    Current Facility-Administered Medications:     acetaminophen (TYLENOL) tablet 650 mg, Q6H PRN    benzonatate (TESSALON PERLES) capsule 100 mg, TID PRN    cefTRIAXone (ROCEPHIN) IVPB (premix in dextrose) 1,000 mg 50 mL, Q24H, Last Rate: 1,000 mg (10/24/24 2410)    dextromethorphan-guaiFENesin (ROBITUSSIN DM) oral syrup 10 mL, Q4H PRN    docusate sodium (COLACE) capsule 100 mg, BID    enoxaparin (LOVENOX) subcutaneous injection 40 mg, Daily    ferrous sulfate tablet 325 mg, Daily With Breakfast    guaiFENesin (MUCINEX) 12 hr tablet 600 mg, Q12H ABNER    levalbuterol (XOPENEX) inhalation solution 1.25 mg, TID    loratadine (CLARITIN) tablet 10 mg, Daily    magnesium sulfate IVPB (premix) SOLN 1 g, Once    ondansetron (ZOFRAN) injection 4 mg, Q6H PRN    polyethylene glycol (MIRALAX) packet 17 g, Daily    sodium chloride 0.9 % infusion, Continuous, Last Rate: 75 mL/hr (10/25/24 0906)    vancomycin (VANCOCIN) IVPB (premix in dextrose) 1,000 mg 200 mL, Q8H, Last Rate: 1,000 mg (10/25/24 0853)    Medications Prior to Admission:     [] amoxicillin-clavulanate (AUGMENTIN) 875-125 mg per tablet  Allergies   Allergen Reactions    Adhesive [Medical Tape]      Social History     Socioeconomic History    Marital status: Single     Spouse name: None    Number of children: None    Years of education: None    Highest education level: None   Occupational History    None   Tobacco Use    Smoking status: Former     Current packs/day: 0.00     Average packs/day: 1 pack/day for 3.0 years (3.0 ttl pk-yrs)     Types: Cigarettes     Start date: 2012     Quit date: 2015     Years since quittin.8    Smokeless tobacco: Never    Tobacco comments:     quit 3 yrs ago   Vaping Use    Vaping status: Never Used   Substance and Sexual Activity    Alcohol use: Never     Comment: none    Drug use: No    Sexual activity: Yes     Partners: Male     Birth control/protection: None   Other Topics Concern    None   Social History  Narrative    None     Social Determinants of Health     Financial Resource Strain: Not on file   Food Insecurity: No Food Insecurity (10/23/2024)    Nursing - Inadequate Food Risk Classification     Worried About Running Out of Food in the Last Year: Not on file     Ran Out of Food in the Last Year: Not on file     Ran Out of Food in the Last Year: 1   Transportation Needs: No Transportation Needs (10/23/2024)    Nursing - Transportation Risk Classification     Lack of Transportation: Not on file     Lack of Transportation: 2   Physical Activity: Not on file   Stress: Not on file   Social Connections: Not on file   Intimate Partner Violence: Unknown (10/23/2024)    Nursing IPS     Feels Physically and Emotionally Safe: Not on file     Physically Hurt by Someone: Not on file     Humiliated or Emotionally Abused by Someone: Not on file     Physically Hurt by Someone: 2     Hurt or Threatened by Someone: 2   Housing Stability: Unknown (10/23/2024)    Nursing: Inadequate Housing Risk Classification     Has Housing: Not on file     Worried About Losing Housing: Not on file     Unable to Get Utilities: Not on file     Unable to Pay for Housing in the Last Year: 2     Has Housin         Review of Systems   All other systems reviewed and are negative.        Vitals:    10/25/24 0747 10/25/24 0754 10/25/24 0900 10/25/24 1100   BP:  116/67 124/73 102/70   BP Location:  Left arm Left arm Left arm   Pulse:  (!) 111 (!) 135 101   Resp:  16  15   Temp:    98.1 °F (36.7 °C)   TempSrc:    Oral   SpO2: 92% 98%  93%   Weight:       Height:         Orthostatic Blood Pressures      Flowsheet Row Most Recent Value   Blood Pressure 102/70 filed at 10/25/2024 1100   Patient Position - Orthostatic VS Sitting filed at 10/25/2024 1100          Body mass index is 20.25 kg/m².  Wt Readings from Last 5 Encounters:   10/24/24 53.5 kg (118 lb)   10/16/24 53.5 kg (117 lb 15.1 oz)   21 47.2 kg (104 lb)   21 49 kg (108 lb)   05/10/19  50.8 kg (112 lb)     I/O last 3 completed shifts:  In: 1300 [P.O.:200; I.V.:1000; IV Piggyback:100]  Out: -       Physical Exam  Vitals and nursing note reviewed.   Constitutional:       General: She is not in acute distress.     Appearance: Normal appearance. She is well-developed. She is not ill-appearing.   HENT:      Head: Normocephalic and atraumatic.      Nose: No congestion.   Eyes:      General: No scleral icterus.     Conjunctiva/sclera: Conjunctivae normal.   Neck:      Vascular: No carotid bruit or JVD.   Cardiovascular:      Rate and Rhythm: Normal rate and regular rhythm.      Pulses: Normal pulses.      Heart sounds: Normal heart sounds. No murmur heard.     No friction rub. No gallop.   Pulmonary:      Effort: Pulmonary effort is normal. No respiratory distress.      Breath sounds: Normal breath sounds. No rales.   Abdominal:      General: There is no distension.      Palpations: Abdomen is soft.      Tenderness: There is no abdominal tenderness.   Musculoskeletal:         General: No swelling or tenderness.      Cervical back: Neck supple.      Right lower leg: No edema.      Left lower leg: No edema.   Skin:     General: Skin is warm.   Neurological:      General: No focal deficit present.      Mental Status: She is alert and oriented to person, place, and time. Mental status is at baseline.   Psychiatric:         Mood and Affect: Mood normal.         Behavior: Behavior normal.         Thought Content: Thought content normal.           Labs:  Results from last 7 days   Lab Units 10/25/24  0513 10/24/24  0443 10/23/24  0447 10/22/24  2329   WBC Thousand/uL 4.75 5.70 5.27 6.24   HEMOGLOBIN g/dL 8.9* 8.7* 9.1* 9.6*   HEMATOCRIT % 26.5* 23.8* 28.3* 30.5*   RDW % 15.1 14.5 13.3 13.2   PLATELETS Thousands/uL 298 269 254 265     Results from last 7 days   Lab Units 10/25/24  0513 10/24/24  0443 10/23/24  0447 10/22/24  2329   POTASSIUM mmol/L 3.9 3.8 3.8 3.8   CHLORIDE mmol/L 103 104 104 101   CO2 mmol/L  27 26 26 28   MAGNESIUM mg/dL 1.9 1.9 1.7*  --    BUN mg/dL 2* 4* 4* 5   CREATININE mg/dL 0.54* 0.59* 0.57* 0.58*   CALCIUM mg/dL 8.9 8.6 8.4 9.2   AST U/L  --   --   --  14   ALT U/L  --   --   --  10   ALK PHOS U/L  --   --   --  40                         Imaging: Echo complete w/ contrast if indicated    Result Date: 10/24/2024  Narrative:   Left Ventricle: Left ventricular cavity size is normal. Wall thickness is normal. The left ventricular ejection fraction is 70%. Systolic function is vigorous. Wall motion is normal. Diastolic function is normal.     CT chest wo contrast    Result Date: 10/23/2024  Narrative: CT CHEST WITHOUT IV CONTRAST INDICATION: recurrent pneumonia. COMPARISON: Chest x-ray from yesterday. TECHNIQUE: CT examination of the chest was performed without intravenous contrast. Multiplanar 2D reformatted images were created from the source data. This examination, like all CT scans performed in the [facility], was performed utilizing techniques to minimize radiation dose exposure, including the use of iterative reconstruction and automated exposure control. Radiation dose length product (DLP) for this visit: 196.19 mGy-cm FINDINGS: LUNGS: Multi lobar pneumonia with the most dense consolidation in the right upper lobe posteriorly and left lower lobe laterally. Additional areas of pneumonia involving the lingula, right lower lobe and right middle lobe with more of a reticulonodular pattern. No endotracheal or endobronchial lesion. PLEURA: Unremarkable. HEART/GREAT VESSELS: Heart is unremarkable for patient's age. No thoracic aortic aneurysm. MEDIASTINUM AND HARRIS: No adenopathy. Residual thymic tissue in the anterior mediastinum. CHEST WALL AND LOWER NECK: Grossly intact bilateral breast implants. Normal thyroid gland. No axillary adenopathy. VISUALIZED STRUCTURES IN THE UPPER ABDOMEN: Bilateral nonobstructing intrarenal calculi the largest on the right measuring 6 mm and on the  left measuring 3 mm. Upper abdomen otherwise unremarkable. OSSEOUS STRUCTURES: No acute fracture or destructive osseous lesion.     Impression: Multi lobar pneumonia with the most dense consolidation in the right upper lobe posteriorly and left lower lobe laterally. Additional areas of pneumonia involving the lingula, right lower lobe and right middle lobe with more of a reticulonodular pattern. Bilateral nonobstructing intrarenal calculi. Workstation performed: [id number]     XR chest 2 views    Result Date: 10/23/2024  Narrative: XR CHEST PA AND LATERAL INDICATION: Recent pneumonia, persistent symptoms despite several days of antibiotics. COMPARISON: Chest radiograph 10/16/2024. FINDINGS: Interval progression of right upper lobe pneumonia with redistribution, now with prominent opacification along the fissure. Additionally there has been interval progression of left lower lobe retrocardiac opacity, consistent with developing infectious consolidation No pneumothorax or pleural effusion. Normal cardiomediastinal silhouette. Bones are unremarkable for age. Normal upper abdomen.     Impression: Interval progression of right upper lobe pneumonia, with additional developing left lower lobe consolidation. Resident: Carmelo Juarez I, the attending radiologist, have reviewed the images and agree with the final report above. Workstation performed: [id number]     XR chest 2 views    Result Date: 10/17/2024  Narrative: XR CHEST PA AND LATERAL INDICATION: concern for pneumonia. Cough and dyspnea for 4 days. COMPARISON: None FINDINGS: Right upper lobe opacity, suggesting pneumonia. No pneumothorax or pleural effusion. Normal cardiomediastinal silhouette. Bones are unremarkable for age. Normal upper abdomen.     Impression: Right upper lobe opacity suspicious for pneumonia. Follow-up to ensure resolution can be considered if clinically necessary. Resident: ENIO BARAJAS I, the attending radiologist, have reviewed the images  and agree with the final report above. Workstation performed: [id number]       Cardiac testing:   No results found for this or any previous visit.    No results found for this or any previous visit.    No results found for this or any previous visit.    No results found for this or any previous visit.

## 2024-10-25 NOTE — PROGRESS NOTES
Progress Note - Pulmonology   Name: Prerna Garcia 29 y.o. female I MRN: [id number]  Unit/Bed#: -01 I Date of Admission: 10/22/2024   Date of Service: 10/25/2024 I Hospital Day: 1     Assessment & Plan  Bilateral pneumonia  Her chest imaging showed features of right lung predominantly right upper lobe.  She has no leukocytosis and her procalcitonin has been low.  Her chest auscultation revealed occasional crackles on the right side and occasional rhonchi.  She was empirically started on ceftriaxone and azithromycin.  Her CT scan however showed multilobar pneumonia.  She most probably has viral pneumonia.  Continue antibiotics for now.  The blood cultures are negative so far.  The respiratory viral panel was negative.  ID service was consulted.  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  Currently she is not needing any oxygen supplementation.   Anemia  Her hemoglobin was found to be 8.7..  The etiology of this iron deficiency anemia is not clear at this time.       I had a long discussion with her and have answered all her questions.  I have advised her to follow-up with us in the pulmonary office couple of weeks after discharge.  Card given    Discussed with the primary team.    Thank you for allowing me to participate in the care of the patient.    HISTORY:  Patient states that she is feeling much better.  Denies any significant cough or phlegm.  No wheezing no chest pain.    PHYSICAL EXAMINATION:  On clinical examination she is comfortable.  Hemodynamically stable and afebrile.  Comfortable on room air at rest.  HEENT: No conjunctival pallor no cyanosis.  Neck: No jugular venous distention no significant neck or supraclavicular adenopathy.  Heart S1-S2 heard.  Chest air entry present bilaterally no significant crackles or rhonchi.  Abdomen soft and nontender bowel sounds audible.  Neuro awake alert oriented.  Extremities: No clubbing no  edema.    LABORATORY INVESTIGATIONS:  White cell count 4.75 hemoglobin 8.9 platelet 298 sodium 138 potassium 3.9 creatinine 0.54 magnesium 1.9 procalcitonin 0.39.  Blood cultures negative so far.

## 2024-10-25 NOTE — ASSESSMENT & PLAN NOTE
Her chest imaging showed features of right lung predominantly right upper lobe.  She has no leukocytosis and her procalcitonin has been low.  Her chest auscultation revealed occasional crackles on the right side and occasional rhonchi.  She was empirically started on ceftriaxone and azithromycin.  Her CT scan however showed multilobar pneumonia.  She most probably has viral pneumonia.  Continue antibiotics for now.  The blood cultures are negative so far.  The respiratory viral panel was negative.  ID service was consulted.  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  Currently she is not needing any oxygen supplementation.

## 2024-10-25 NOTE — ASSESSMENT & PLAN NOTE
Patient with persistent sinus tachycardia while inpatient, most likely brought on by pneumonia with anemia contributing.  EKG on admission showing sinus tachycardia with   Troponins negative x3, doubt ACS in absence of chest pain.  Echo: EF 70%, vigorous systolic function. No significant valvular abnormalities.  HR in 160s while walking to bathroom today, asymptomatic.  Received 1x dose 5 mg IV Lopressor, HR improved to 100s.  Cardiology consulted, continue telemetry monitoring

## 2024-10-25 NOTE — PROGRESS NOTES
Progress Note - Hospitalist   Name: Prerna Garcia 29 y.o. female I MRN: [id number]  Unit/Bed#: -01 I Date of Admission: 10/22/2024   Date of Service: 10/25/2024 I Hospital Day: 1    Assessment & Plan  Bilateral pneumonia  Patient presented with persistent productive cough and fever.  Recently seen in ED on 10/16, diagnosed with right-sided pneumonia and discharged on Augmentin.  Did not meet SIRS criteria on admission, only noted to have temp 100.5 on arrival to ED.  CT chest showed multilobar pneumonia, most dense consolidation in RUL and LLL  D-dimer (10/16) negative. COVID/flu/strep PCR negative.  RP2 negative. Legionella/strep pneumo antigens negative.  Procal: 1.24->0.39. BC x2, MRSA & sputum cx: Pending.  Pulmonology following, recommendations appreciated  Completed 3 days azithromycin, last dose today.  Previous provider discussed with ID julian, continue IV Rocephin + IV vancomycin pending cx results  Tachycardia  Patient with persistent sinus tachycardia while inpatient, most likely brought on by pneumonia with anemia contributing.  EKG on admission showing sinus tachycardia with   Troponins negative x3, doubt ACS in absence of chest pain.  Echo: EF 70%, vigorous systolic function. No significant valvular abnormalities.  HR in 160s while walking to bathroom today, asymptomatic.  Received 1x dose 5 mg IV Lopressor, HR improved to 100s.  Cardiology consulted, continue telemetry monitoring  Anemia  History of anemia noted per chart review, prior Hgb as low at 8.8 in 2019  Vitamin B12, folate wnl. Iron panel suggestive of iron deficiency with low iron, borderline low/nl ferritin.  Started daily iron supplements, avoid IV Venofer while on antibiotics for pneumonia as above  Hgb remains stable around 8-9, no s/sx active bleeding  Monitor CBC while inpatient  Hypomagnesemia  Magnesium 1.7 on admission, since repleted  Repleting with IV mag sulfate, Mag 1.9 today  Monitor BMP/Mag, replete  electrolytes PRN    VTE Pharmacologic Prophylaxis: VTE Score: 1 Low Risk (Score 0-2) - Encourage Ambulation.    Mobility:   Basic Mobility Inpatient Raw Score: 24  JH-HLM Goal: 8: Walk 250 feet or more  JH-HLM Achieved: 8: Walk 250 feet ot more  JH-HLM Goal achieved. Continue to encourage appropriate mobility.    Patient Centered Rounds: I performed bedside rounds with nursing staff today.   Discussions with Specialists or Other Care Team Provider: Case management    Education and Discussions with Family / Patient: Updated  () via phone.    Current Length of Stay: 1 day(s)  Current Patient Status: Inpatient   Certification Statement: The patient will continue to require additional inpatient hospital stay due to IV antibiotics, clinical improvement  Discharge Plan: Anticipate discharge in 24-48 hrs to home.    Code Status: Level 1 - Full Code    Subjective   Patient is seen at bedside this a.m., seen ambulating out of bathroom without difficulty. Denies chest pain, palpitations, SOB, dizziness, vision changes.     Objective :  Temp:  [98 °F (36.7 °C)-99.2 °F (37.3 °C)] 98.1 °F (36.7 °C)  HR:  [101-135] 101  BP: (102-125)/(67-74) 102/70  Resp:  [15-18] 15  SpO2:  [90 %-99 %] 94 %  O2 Device: None (Room air)    Body mass index is 20.25 kg/m².     Input and Output Summary (last 24 hours):   No intake or output data in the 24 hours ending 10/25/24 1451    Physical Exam  Constitutional:       General: She is not in acute distress.     Appearance: She is not ill-appearing, toxic-appearing or diaphoretic.   Cardiovascular:      Rate and Rhythm: Regular rhythm. Tachycardia present.      Pulses: Normal pulses.      Heart sounds: Normal heart sounds.   Pulmonary:      Effort: Pulmonary effort is normal. No respiratory distress.      Comments: Slightly diminished breath sounds in left base  Abdominal:      General: Bowel sounds are normal. There is no distension.      Palpations: Abdomen is soft.       Tenderness: There is no abdominal tenderness.   Musculoskeletal:         General: No swelling or tenderness.   Skin:     General: Skin is warm.   Neurological:      General: No focal deficit present.      Mental Status: She is alert.   Psychiatric:         Mood and Affect: Mood normal.         Behavior: Behavior normal.         Lines/Drains:        Telemetry:  Telemetry Orders (From admission, onward)               24 Hour Telemetry Monitoring  Continuous x 24 Hours (Telem)        Question:  Reason for 24 Hour Telemetry  Answer:  Arrhythmias requiring acute medical intervention / PPM or ICD malfunction                     Telemetry Reviewed: Sinus Tachycardia  Indication for Continued Telemetry Use: Arrthymias requiring medical therapy               Lab Results: I have reviewed the following results:   Results from last 7 days   Lab Units 10/25/24  0513 10/23/24  0447 10/22/24  2329   WBC Thousand/uL 4.75   < > 6.24   HEMOGLOBIN g/dL 8.9*   < > 9.6*   HEMATOCRIT % 26.5*   < > 30.5*   PLATELETS Thousands/uL 298   < > 265   SEGS PCT %  --   --  78*   LYMPHO PCT %  --   --  10*   MONO PCT %  --   --  7   EOS PCT %  --   --  5    < > = values in this interval not displayed.     Results from last 7 days   Lab Units 10/25/24  0513 10/23/24  0447 10/22/24  2329   SODIUM mmol/L 138   < > 138   POTASSIUM mmol/L 3.9   < > 3.8   CHLORIDE mmol/L 103   < > 101   CO2 mmol/L 27   < > 28   BUN mg/dL 2*   < > 5   CREATININE mg/dL 0.54*   < > 0.58*   ANION GAP mmol/L 8   < > 9   CALCIUM mg/dL 8.9   < > 9.2   ALBUMIN g/dL  --   --  3.7   TOTAL BILIRUBIN mg/dL  --   --  0.73   ALK PHOS U/L  --   --  40   ALT U/L  --   --  10   AST U/L  --   --  14   GLUCOSE RANDOM mg/dL 92   < > 94    < > = values in this interval not displayed.                 Results from last 7 days   Lab Units 10/25/24  0513 10/24/24  0443 10/22/24  2329   LACTIC ACID mmol/L  --   --  0.7   PROCALCITONIN ng/ml 0.39* 1.24* <0.05       Recent Cultures (last 7 days):    Results from last 7 days   Lab Units 10/24/24  1216 10/24/24  1204 10/24/24  0922 10/23/24  0130   BLOOD CULTURE  No Growth at 24 hrs. No Growth at 24 hrs.  --   --    SPUTUM CULTURE   --   --  Culture too young- will reincubate  --    GRAM STAIN RESULT   --   --  2+ Gram positive cocci in pairs and chains*  Rare Epithelial cells per low power field*  No polys seen*  --    LEGIONELLA URINARY ANTIGEN   --   --   --  Negative       Imaging Results Review: I reviewed radiology reports from this admission including: chest xray, CT chest, and Echocardiogram.  Other Study Results Review: No additional pertinent studies reviewed.    Last 24 Hours Medication List:     Current Facility-Administered Medications:     acetaminophen (TYLENOL) tablet 650 mg, Q6H PRN    benzonatate (TESSALON PERLES) capsule 100 mg, TID PRN    cefTRIAXone (ROCEPHIN) IVPB (premix in dextrose) 1,000 mg 50 mL, Q24H, Last Rate: 1,000 mg (10/24/24 1890)    dextromethorphan-guaiFENesin (ROBITUSSIN DM) oral syrup 10 mL, Q4H PRN    docusate sodium (COLACE) capsule 100 mg, BID    enoxaparin (LOVENOX) subcutaneous injection 40 mg, Daily    ferrous sulfate tablet 325 mg, Daily With Breakfast    guaiFENesin (MUCINEX) 12 hr tablet 600 mg, Q12H ABNER    levalbuterol (XOPENEX) inhalation solution 1.25 mg, TID    loratadine (CLARITIN) tablet 10 mg, Daily    ondansetron (ZOFRAN) injection 4 mg, Q6H PRN    polyethylene glycol (MIRALAX) packet 17 g, Daily    vancomycin (VANCOCIN) IVPB (premix in dextrose) 1,000 mg 200 mL, Q8H, Last Rate: 1,000 mg (10/25/24 0853)    Administrative Statements   Today, Patient Was Seen By: Silvana Abarca PA-C  I have spent a total time of 45 minutes in caring for this patient on the day of the visit/encounter including Counseling / Coordination of care, Documenting in the medical record, Reviewing / ordering tests, medicine, procedures  , Obtaining or reviewing history  , and Communicating with other healthcare professionals  .    **Please Note: This note may have been constructed using a voice recognition system.**

## 2024-10-26 VITALS
RESPIRATION RATE: 16 BRPM | HEART RATE: 120 BPM | OXYGEN SATURATION: 91 % | DIASTOLIC BLOOD PRESSURE: 66 MMHG | TEMPERATURE: 98.6 F | SYSTOLIC BLOOD PRESSURE: 118 MMHG | BODY MASS INDEX: 20.14 KG/M2 | WEIGHT: 118 LBS | HEIGHT: 64 IN

## 2024-10-26 PROBLEM — Z3A.39 39 WEEKS GESTATION OF PREGNANCY: Status: RESOLVED | Noted: 2019-04-02 | Resolved: 2024-10-26

## 2024-10-26 PROBLEM — E83.42 HYPOMAGNESEMIA: Status: RESOLVED | Noted: 2024-10-23 | Resolved: 2024-10-26

## 2024-10-26 PROBLEM — Z98.891 STATUS POST REPEAT LOW TRANSVERSE CESAREAN SECTION: Status: RESOLVED | Noted: 2019-04-02 | Resolved: 2024-10-26

## 2024-10-26 LAB
ANION GAP SERPL CALCULATED.3IONS-SCNC: 9 MMOL/L (ref 4–13)
BACTERIA SPT RESP CULT: ABNORMAL
BASOPHILS # BLD AUTO: 0.03 THOUSANDS/ΜL (ref 0–0.1)
BASOPHILS NFR BLD AUTO: 1 % (ref 0–1)
BUN SERPL-MCNC: 3 MG/DL (ref 5–25)
CALCIUM SERPL-MCNC: 9.4 MG/DL (ref 8.4–10.2)
CHLORIDE SERPL-SCNC: 103 MMOL/L (ref 96–108)
CO2 SERPL-SCNC: 27 MMOL/L (ref 21–32)
CREAT SERPL-MCNC: 0.55 MG/DL (ref 0.6–1.3)
EOSINOPHIL # BLD AUTO: 0.28 THOUSAND/ΜL (ref 0–0.61)
EOSINOPHIL NFR BLD AUTO: 7 % (ref 0–6)
ERYTHROCYTE [DISTWIDTH] IN BLOOD BY AUTOMATED COUNT: 16.7 % (ref 11.6–15.1)
GFR SERPL CREATININE-BSD FRML MDRD: 126 ML/MIN/1.73SQ M
GLUCOSE SERPL-MCNC: 86 MG/DL (ref 65–140)
GRAM STN SPEC: ABNORMAL
HCT VFR BLD AUTO: 26.9 % (ref 34.8–46.1)
HGB BLD-MCNC: 9.7 G/DL (ref 11.5–15.4)
IMM GRANULOCYTES # BLD AUTO: 0.02 THOUSAND/UL (ref 0–0.2)
IMM GRANULOCYTES NFR BLD AUTO: 1 % (ref 0–2)
LYMPHOCYTES # BLD AUTO: 0.72 THOUSANDS/ΜL (ref 0.6–4.47)
LYMPHOCYTES NFR BLD AUTO: 17 % (ref 14–44)
MAGNESIUM SERPL-MCNC: 2 MG/DL (ref 1.9–2.7)
MCH RBC QN AUTO: 32.9 PG (ref 26.8–34.3)
MCHC RBC AUTO-ENTMCNC: 36.1 G/DL (ref 31.4–37.4)
MCV RBC AUTO: 91 FL (ref 82–98)
MONOCYTES # BLD AUTO: 0.4 THOUSAND/ΜL (ref 0.17–1.22)
MONOCYTES NFR BLD AUTO: 9 % (ref 4–12)
NEUTROPHILS # BLD AUTO: 2.79 THOUSANDS/ΜL (ref 1.85–7.62)
NEUTS SEG NFR BLD AUTO: 65 % (ref 43–75)
NRBC BLD AUTO-RTO: 0 /100 WBCS
PLATELET # BLD AUTO: 341 THOUSANDS/UL (ref 149–390)
PMV BLD AUTO: 9.5 FL (ref 8.9–12.7)
POTASSIUM SERPL-SCNC: 4.2 MMOL/L (ref 3.5–5.3)
PROCALCITONIN SERPL-MCNC: 0.07 NG/ML
RBC # BLD AUTO: 2.95 MILLION/UL (ref 3.81–5.12)
SODIUM SERPL-SCNC: 139 MMOL/L (ref 135–147)
WBC # BLD AUTO: 4.24 THOUSAND/UL (ref 4.31–10.16)

## 2024-10-26 PROCEDURE — 94668 MNPJ CHEST WALL SBSQ: CPT

## 2024-10-26 PROCEDURE — 80048 BASIC METABOLIC PNL TOTAL CA: CPT | Performed by: PHYSICIAN ASSISTANT

## 2024-10-26 PROCEDURE — 85025 COMPLETE CBC W/AUTO DIFF WBC: CPT | Performed by: PHYSICIAN ASSISTANT

## 2024-10-26 PROCEDURE — 84145 PROCALCITONIN (PCT): CPT | Performed by: PHYSICIAN ASSISTANT

## 2024-10-26 PROCEDURE — 83735 ASSAY OF MAGNESIUM: CPT | Performed by: PHYSICIAN ASSISTANT

## 2024-10-26 PROCEDURE — 94640 AIRWAY INHALATION TREATMENT: CPT

## 2024-10-26 PROCEDURE — 99239 HOSP IP/OBS DSCHRG MGMT >30: CPT | Performed by: FAMILY MEDICINE

## 2024-10-26 PROCEDURE — 94760 N-INVAS EAR/PLS OXIMETRY 1: CPT

## 2024-10-26 RX ORDER — ALBUTEROL SULFATE 90 UG/1
2 INHALANT RESPIRATORY (INHALATION) EVERY 6 HOURS PRN
Qty: 8.5 G | Refills: 0 | Status: SHIPPED | OUTPATIENT
Start: 2024-10-26

## 2024-10-26 RX ORDER — GUAIFENESIN 600 MG/1
600 TABLET, EXTENDED RELEASE ORAL EVERY 12 HOURS SCHEDULED
Qty: 20 TABLET | Refills: 0 | Status: SHIPPED | OUTPATIENT
Start: 2024-10-26

## 2024-10-26 RX ORDER — BENZONATATE 100 MG/1
100 CAPSULE ORAL 3 TIMES DAILY PRN
Qty: 20 CAPSULE | Refills: 0 | Status: SHIPPED | OUTPATIENT
Start: 2024-10-26

## 2024-10-26 RX ORDER — GUAIFENESIN/DEXTROMETHORPHAN 100-10MG/5
10 SYRUP ORAL EVERY 4 HOURS PRN
Qty: 237 ML | Refills: 0 | Status: SHIPPED | OUTPATIENT
Start: 2024-10-26

## 2024-10-26 RX ADMIN — LEVALBUTEROL HYDROCHLORIDE 1.25 MG: 1.25 SOLUTION RESPIRATORY (INHALATION) at 07:16

## 2024-10-26 RX ADMIN — CEFTRIAXONE 1000 MG: 1 INJECTION, SOLUTION INTRAVENOUS at 00:29

## 2024-10-26 RX ADMIN — FERROUS SULFATE TAB 325 MG (65 MG ELEMENTAL FE) 325 MG: 325 (65 FE) TAB at 08:36

## 2024-10-26 RX ADMIN — GUAIFENESIN AND DEXTROMETHORPHAN 10 ML: 100; 10 SYRUP ORAL at 02:17

## 2024-10-26 RX ADMIN — GUAIFENESIN 600 MG: 600 TABLET ORAL at 08:36

## 2024-10-26 RX ADMIN — BENZONATATE 100 MG: 100 CAPSULE ORAL at 02:17

## 2024-10-26 RX ADMIN — GUAIFENESIN AND DEXTROMETHORPHAN 10 ML: 100; 10 SYRUP ORAL at 06:13

## 2024-10-26 RX ADMIN — VANCOMYCIN HYDROCHLORIDE 1000 MG: 1 INJECTION, SOLUTION INTRAVENOUS at 00:35

## 2024-10-26 RX ADMIN — LORATADINE 10 MG: 10 TABLET ORAL at 08:36

## 2024-10-26 NOTE — PLAN OF CARE
Problem: PAIN - ADULT  Goal: Verbalizes/displays adequate comfort level or baseline comfort level  Description: Interventions:  - Encourage patient to monitor pain and request assistance  - Assess pain using appropriate pain scale  - Administer analgesics based on type and severity of pain and evaluate response  - Implement non-pharmacological measures as appropriate and evaluate response  - Consider cultural and social influences on pain and pain management  - Notify physician/advanced practitioner if interventions unsuccessful or patient reports new pain  Outcome: Progressing     Problem: INFECTION - ADULT  Goal: Absence or prevention of progression during hospitalization  Description: INTERVENTIONS:  - Assess and monitor for signs and symptoms of infection  - Monitor lab/diagnostic results  - Monitor all insertion sites, i.e. indwelling lines, tubes, and drains  - Monitor endotracheal if appropriate and nasal secretions for changes in amount and color  - [place] appropriate cooling/warming therapies per order  - Administer medications as ordered  - Instruct and encourage patient and family to use good hand hygiene technique  - Identify and instruct in appropriate isolation precautions for identified infection/condition  Outcome: Progressing     Problem: SAFETY ADULT  Goal: Patient will remain free of falls  Description: INTERVENTIONS:  - Educate patient/family on patient safety including physical limitations  - Instruct patient to call for assistance with activity   - Consult OT/PT to assist with strengthening/mobility   - Keep Call bell within reach  - Keep bed low and locked with side rails adjusted as appropriate  - Keep care items and personal belongings within reach  - Initiate and maintain comfort rounds  - Make Fall Risk Sign visible to staff  - Apply yellow socks and bracelet for high fall risk patients  - Consider moving patient to room near nurses station  Outcome: Progressing

## 2024-10-26 NOTE — ASSESSMENT & PLAN NOTE
Patient presented with persistent productive cough and fever.  Recently seen in ED on 10/16, diagnosed with right-sided pneumonia and discharged on Augmentin.  Did not meet SIRS criteria on admission, only noted to have temp 100.5 on arrival to ED.  CT chest showed multilobar pneumonia, most dense consolidation in RUL and LLL  D-dimer (10/16) negative. COVID/flu/strep PCR negative.  RP2 negative. Legionella/strep pneumo antigens negative.  Procal: 1.24->0.39. BC x2, MRSA & sputum cx: Pending.  Pulmonology following, recommendations appreciated  Completed 3 days azithromycin, last dose today.  Significant clinical improvement.  Procalcitonin within normal limits today.  Continues to have some wheezing.  IV infiltrated and patient requesting discharge.  Will send with another 3 days of antibiotics, Tessalon Perles, Robitussin DM and guaifenesin.  She will need follow-up with her primary care doctor and pulmonology.  Will need a repeat CT

## 2024-10-26 NOTE — DISCHARGE SUMMARY
Discharge Summary - Hospitalist   Name: Prerna Garcia 29 y.o. female I MRN: [id number]  Unit/Bed#: -01 I Date of Admission: 10/22/2024   Date of Service: 10/26/2024 I Hospital Day: 2     Assessment & Plan  Bilateral pneumonia  Patient presented with persistent productive cough and fever.  Recently seen in ED on 10/16, diagnosed with right-sided pneumonia and discharged on Augmentin.  Did not meet SIRS criteria on admission, only noted to have temp 100.5 on arrival to ED.  CT chest showed multilobar pneumonia, most dense consolidation in RUL and LLL  D-dimer (10/16) negative. COVID/flu/strep PCR negative.  RP2 negative. Legionella/strep pneumo antigens negative.  Procal: 1.24->0.39. BC x2, MRSA & sputum cx: Pending.  Pulmonology following, recommendations appreciated  Completed 3 days azithromycin, last dose today.  Significant clinical improvement.  Procalcitonin within normal limits today.  Continues to have some wheezing.  IV infiltrated and patient requesting discharge.  Will send with another 3 days of antibiotics, Tessalon Perles, Robitussin DM and guaifenesin.  She will need follow-up with her primary care doctor and pulmonology.  Will need a repeat CT  Tachycardia  Patient with persistent sinus tachycardia while inpatient, most likely brought on by pneumonia with anemia contributing.  EKG on admission showing sinus tachycardia with   Troponins negative x3, doubt ACS in absence of chest pain.  Echo: EF 70%, vigorous systolic function. No significant valvular abnormalities.  HR in 160s while walking to bathroom today, asymptomatic.  Received 1x dose 5 mg IV Lopressor, HR improved to 100s.  Cardiology consulted, continue telemetry monitoring  Anemia  History of anemia noted per chart review, prior Hgb as low at 8.8 in 2019  Vitamin B12, folate wnl. Iron panel suggestive of iron deficiency with low iron, borderline low/nl ferritin.  Started daily iron supplements, avoid IV Venofer while on  antibiotics for pneumonia as above  Hgb remains stable around 8-9, no s/sx active bleeding  Monitor CBC while inpatient     Medical Problems       Resolved Problems  Date Reviewed: 10/23/2024            Resolved    Hypomagnesemia 10/26/2024     Resolved by  Seng Brice MD        Discharging Physician / Practitioner: Seng Brice MD  PCP: Mikhail Escobar DO  Admission Date:   Admission Orders (From admission, onward)       Ordered        10/24/24 1427  INPATIENT ADMISSION  Once            10/23/24 0031  Place in Observation  Once                          Discharge Date: 10/26/24    Consultations During Hospital Stay:  Pulmonology  Cardiology    Procedures Performed:   None    Significant Findings / Test Results:   Multi lobar pneumonia dense consolidation right upper lobe posteriorly and left lower lobe laterally.  Areas of pneumonia involving lingula right lower lobe and right middle lobe with more of a reticulonodular pattern.    Bilateral nonobstructing intrarenal calculi    Incidental Findings:   None    Test Results Pending at Discharge (will require follow up):   None     Outpatient Tests Requested:  Repeat chest CT 2 to 3 weeks  Follow-up with pulmonology    Complications: None    Reason for Admission: Bilateral pneumonia    Hospital Course:   Prerna Garcia is a 29 y.o. female patient who originally presented to the hospital on 10/22/2024 due to bilateral pneumonia.  Seen by pulmonology and cardiology.  Initially started on ceftriaxone azithromycin.  LUIS jordan recommended adding vancomycin until cultures return.  Respiratory viral panel negative.  MRSA culture negative.  Sputum culture mixed respiratory jim no significant growth of staph/MRSA or Pseudomonas.  Patient did not require oxygen.  Notes an improvement overall.  Requesting discharge today.  She will follow-up with pulmonology and her primary care physician.  Procalcitonin 0.07 at discharge.    Hospital Course: No notes on  "file      Please see above list of diagnoses and related plan for additional information.     Condition at Discharge: good    Discharge Day Visit / Exam:   Subjective: Patient resting comfortably bedside.  She would like to home today.  Vitals: Blood Pressure: 118/66 (10/26/24 0810)  Pulse: (!) 120 (10/26/24 0810)  Temperature: 98.6 °F (37 °C) (10/26/24 0810)  Temp Source: Oral (10/26/24 0810)  Respirations: 16 (10/26/24 0810)  Height: 5' 4\" (162.6 cm) (10/24/24 1415)  Weight - Scale: 53.5 kg (118 lb) (10/24/24 1415)  SpO2: 91 % (10/26/24 0810)  Physical Exam  Vitals and nursing note reviewed.   Constitutional:       Appearance: Normal appearance. She is well-developed.   HENT:      Head: Normocephalic and atraumatic.   Cardiovascular:      Rate and Rhythm: Normal rate and regular rhythm.   Pulmonary:      Effort: Pulmonary effort is normal.      Breath sounds: Wheezing present.   Abdominal:      General: Bowel sounds are normal.      Palpations: Abdomen is soft.   Musculoskeletal:      Cervical back: Normal range of motion.   Skin:     General: Skin is warm.   Neurological:      General: No focal deficit present.      Mental Status: She is alert.   Psychiatric:         Mood and Affect: Mood normal.         Speech: Speech normal.          Discussion with Family: Patient declined call to .     Discharge instructions/Information to patient and family:   See after visit summary for information provided to patient and family.      Provisions for Follow-Up Care:  See after visit summary for information related to follow-up care and any pertinent home health orders.      Mobility at time of Discharge:   Basic Mobility Inpatient Raw Score: 24  JH-HLM Goal: 8: Walk 250 feet or more  JH-HLM Achieved: 8: Walk 250 feet ot more  HLM Goal achieved. Continue to encourage appropriate mobility.     Disposition:   Home    Planned Readmission: None    Discharge Medications:  See after visit summary for reconciled " discharge medications provided to patient and/or family.      Administrative Statements   Discharge Statement:  I have spent a total time of 35 minutes in caring for this patient on the day of the visit/encounter. >30 minutes of time was spent on: Diagnostic results, Prognosis, Risks and benefits of tx options, Instructions for management, Importance of tx compliance, Risk factor reductions, Impressions, Counseling / Coordination of care, Documenting in the medical record, and Reviewing / ordering tests, medicine, procedures  .    **Please Note: This note may have been constructed using a voice recognition system**

## 2024-10-29 LAB
BACTERIA BLD CULT: NORMAL
BACTERIA BLD CULT: NORMAL